# Patient Record
Sex: FEMALE | Race: WHITE | URBAN - METROPOLITAN AREA
[De-identification: names, ages, dates, MRNs, and addresses within clinical notes are randomized per-mention and may not be internally consistent; named-entity substitution may affect disease eponyms.]

---

## 2017-07-19 ENCOUNTER — LAB ONLY (OUTPATIENT)
Dept: INTERNAL MEDICINE CLINIC | Age: 47
End: 2017-07-19

## 2017-07-19 DIAGNOSIS — R79.89 LOW VITAMIN D LEVEL: Primary | ICD-10-CM

## 2017-07-19 LAB — VITAMIN D POC: 46.6 NG/ML (ref 30–96)

## 2018-03-19 PROBLEM — H83.2X9 VESTIBULAR DYSFUNCTION: Status: ACTIVE | Noted: 2018-03-19

## 2018-03-19 PROBLEM — M15.9 OSTEOARTHRITIS OF MULTIPLE JOINTS: Status: ACTIVE | Noted: 2018-03-19

## 2018-03-19 PROBLEM — G43.909 MIGRAINE HEADACHE: Status: ACTIVE | Noted: 2018-03-19

## 2018-03-19 PROBLEM — F41.9 ANXIETY: Status: ACTIVE | Noted: 2018-03-19

## 2018-03-19 PROBLEM — G40.909 SEIZURE DISORDER (HCC): Status: ACTIVE | Noted: 2018-03-19

## 2018-03-27 ENCOUNTER — OFFICE VISIT (OUTPATIENT)
Dept: INTERNAL MEDICINE CLINIC | Age: 48
End: 2018-03-27

## 2018-03-27 VITALS
DIASTOLIC BLOOD PRESSURE: 80 MMHG | OXYGEN SATURATION: 99 % | HEIGHT: 66 IN | SYSTOLIC BLOOD PRESSURE: 128 MMHG | BODY MASS INDEX: 24.91 KG/M2 | WEIGHT: 155 LBS | HEART RATE: 62 BPM

## 2018-03-27 DIAGNOSIS — G43.909 MIGRAINE WITHOUT STATUS MIGRAINOSUS, NOT INTRACTABLE, UNSPECIFIED MIGRAINE TYPE: ICD-10-CM

## 2018-03-27 DIAGNOSIS — M15.9 PRIMARY OSTEOARTHRITIS INVOLVING MULTIPLE JOINTS: ICD-10-CM

## 2018-03-27 DIAGNOSIS — F41.9 ANXIETY: ICD-10-CM

## 2018-03-27 DIAGNOSIS — Z00.00 ROUTINE PHYSICAL EXAMINATION: Primary | ICD-10-CM

## 2018-03-27 DIAGNOSIS — G40.909 SEIZURE DISORDER (HCC): ICD-10-CM

## 2018-03-27 DIAGNOSIS — L40.9 SCALP PSORIASIS: ICD-10-CM

## 2018-03-27 LAB
ALBUMIN SERPL-MCNC: 4.8 G/DL (ref 3.9–5.4)
ALKALINE PHOS POC: 72 U/L (ref 38–126)
ALT SERPL-CCNC: 29 U/L (ref 9–52)
AST SERPL-CCNC: 22 U/L (ref 14–36)
BACTERIA UA POCT, BACTPOCT: NORMAL
BILIRUB UR QL STRIP: NEGATIVE
BUN BLD-MCNC: 13 MG/DL (ref 7–17)
CALCIUM BLD-MCNC: 9.6 MG/DL (ref 8.4–10.2)
CASTS UA POCT: 0
CHLORIDE BLD-SCNC: 103 MMOL/L (ref 98–107)
CHOLEST SERPL-MCNC: 220 MG/DL (ref 0–200)
CLUE CELLS, CLUEPOCT: NEGATIVE
CO2 POC: 28 MMOL/L (ref 22–32)
CREAT BLD-MCNC: 0.7 MG/DL (ref 0.7–1.2)
CRYSTALS UA POCT, CRYSPOCT: NEGATIVE
EGFR (POC): 101.7
EPITHELIAL CELLS POCT: NORMAL
GLUCOSE POC: 86 MG/DL (ref 65–105)
GLUCOSE UR-MCNC: NEGATIVE MG/DL
GRAN# POC: 4.4 K/UL (ref 2–7.8)
GRAN% POC: 56.5 % (ref 37–92)
HBA1C MFR BLD HPLC: 4.6 % (ref 4.5–5.7)
HCT VFR BLD CALC: 43.7 % (ref 37–51)
HDLC SERPL-MCNC: 122 MG/DL (ref 35–130)
HGB BLD-MCNC: 14.6 G/DL (ref 12–18)
KETONES P FAST UR STRIP-MCNC: NEGATIVE MG/DL
LDL CHOLESTEROL POC: 79.4 MG/DL (ref 0–130)
LY# POC: 2.8 K/UL (ref 0.6–4.1)
LY% POC: 37.3 % (ref 10–58.5)
MCH RBC QN: 33.1 PG (ref 26–32)
MCHC RBC-ENTMCNC: 33.4 G/DL (ref 30–36)
MCV RBC: 99 FL (ref 80–97)
MID #, POC: 0.4 K/UL (ref 0–1.8)
MID% POC: 6.2 % (ref 0.1–24)
MUCUS UA POCT, MUCPOCT: NORMAL
PH UR STRIP: 5 [PH] (ref 5–7)
PLATELET # BLD: 260 K/UL (ref 140–440)
POTASSIUM SERPL-SCNC: 4.1 MMOL/L (ref 3.6–5)
PROT SERPL-MCNC: 8.2 G/DL (ref 6.3–8.2)
PROT UR QL STRIP: NEGATIVE
RBC # BLD: 4.4 M/UL (ref 4.2–6.3)
RBC UA POCT, RBCPOCT: 0
SODIUM SERPL-SCNC: 139 MMOL/L (ref 137–145)
SP GR UR STRIP: 1.02 (ref 1.01–1.02)
TCHOL/HDL RATIO (POC): 1.8 (ref 0–4)
TOTAL BILIRUBIN POC: 0.6 MG/DL (ref 0.2–1.3)
TRICH UA POCT, TRICHPOC: NEGATIVE
TRIGL SERPL-MCNC: 93 MG/DL (ref 0–200)
UA UROBILINOGEN AMB POC: NORMAL (ref 0.2–1)
URINALYSIS CLARITY POC: CLEAR
URINALYSIS COLOR POC: NORMAL
URINE BLOOD POC: NEGATIVE
URINE CULT COMMENT, POCT: NORMAL
URINE LEUKOCYTES POC: NEGATIVE
URINE NITRITES POC: NEGATIVE
VLDLC SERPL CALC-MCNC: 18.6 MG/DL
WBC # BLD: 7.6 K/UL (ref 4.1–10.9)
WBC UA POCT, WBCPOCT: 0
YEAST UA POCT, YEASTPOC: NEGATIVE

## 2018-03-27 RX ORDER — SUMATRIPTAN 50 MG/1
50 TABLET, FILM COATED ORAL
COMMUNITY
End: 2018-03-27 | Stop reason: SDUPTHER

## 2018-03-27 RX ORDER — ALPRAZOLAM 0.5 MG/1
0.5 TABLET ORAL AS NEEDED
COMMUNITY
End: 2018-03-27 | Stop reason: SDUPTHER

## 2018-03-27 RX ORDER — CARBAMAZEPINE 200 MG/1
200 TABLET ORAL DAILY
Qty: 225 TAB | Refills: 3 | Status: SHIPPED | OUTPATIENT
Start: 2018-03-27 | End: 2019-04-15 | Stop reason: SDUPTHER

## 2018-03-27 RX ORDER — TRIAMCINOLONE ACETONIDE 1 MG/G
CREAM TOPICAL 2 TIMES DAILY
Qty: 15 G | Refills: 0 | Status: SHIPPED | OUTPATIENT
Start: 2018-03-27 | End: 2019-04-15 | Stop reason: SDUPTHER

## 2018-03-27 RX ORDER — SUMATRIPTAN 50 MG/1
50 TABLET, FILM COATED ORAL
Qty: 27 TAB | Refills: 3 | Status: SHIPPED | OUTPATIENT
Start: 2018-03-27 | End: 2018-07-24 | Stop reason: SDUPTHER

## 2018-03-27 RX ORDER — ALPRAZOLAM 0.5 MG/1
0.5 TABLET ORAL AS NEEDED
Qty: 30 TAB | Refills: 1 | Status: SHIPPED | OUTPATIENT
Start: 2018-03-27 | End: 2019-04-15 | Stop reason: SDUPTHER

## 2018-03-27 NOTE — MR AVS SNAPSHOT
[address] [place] [address] [address] [address] [phone] Patient: Neeraj Carvajal MRN: [id number] :[id number] Visit Information Date & Time Provider Department Dept. Phone Encounter #  
 3/27/2018  3:00 PM Zach Hayes MD [place] [id number] [phone] [id number] Follow-up Instructions Return in about 1 year (around 3/27/2019). Upcoming Health Maintenance Date Due DTaP/Tdap/Td series (1 - Tdap) 3/6/1990 PAP AKA CERVICAL CYTOLOGY 3/6/1990 Influenza Age 5 to Adult 2017 Allergies as of 3/27/2018  Review Complete On: 3/27/2018 By: Zach Hayes MD  
 No Known Allergies Current Immunizations  Never Reviewed Name Date Influenza Vaccine 2017 Not reviewed this visit You Were Diagnosed With   
  
 Codes Comments Routine physical examination    -  Primary ICD-10-CM: Z00.00 ICD-9-CM: V70.0 Seizure disorder ([facility] [address]     ICD-10-CM: G40.909 ICD-9-CM: 345.90 Migraine without status migrainosus, not intractable, unspecified migraine type     ICD-10-CM: G43.909 ICD-9-CM: 346.90 Anxiety     ICD-10-CM: F41.9 ICD-9-CM: 300.00 Primary osteoarthritis involving multiple joints     ICD-10-CM: M15.0 ICD-9-CM: 715.09 Scalp psoriasis     ICD-10-CM: L40.9 ICD-9-CM: 696.1 Vitals BP Pulse Height(growth percentile) Weight(growth percentile) SpO2 BMI  
 128/80 (BP 1 Location: Left arm, BP Patient Position: Sitting) 62 5' 6\" (1.676 m) 155 lb (70.3 kg) 99% 25.02 kg/m2 Smoking Status Never Smoker BMI and BSA Data Body Mass Index Body Surface Area 25.02 kg/m 2 1.81 m 2 Preferred Pharmacy Pharmacy Name Phone [id number] [address] [place], [place] - [place] [phone] Your Updated Medication List  
  
   
This list is accurate as of 3/27/18  3:43 PM.  Always use your most recent med list.  
  
  
  
  
 ALPRAZolam 0.5 mg tablet Commonly known as:  Mame Ferreiraalfredito Take 1 Tab by mouth as needed for Anxiety (for dizziness). Max Daily Amount: 48 mg.  
  
 carBAMazepine 200 mg tablet Commonly known as:  TEGretol Take 1 Tab by mouth daily. And then 1 1/2 tablets at night SUMAtriptan 50 mg tablet Commonly known as:  IMITREX Take 1 Tab by mouth once as needed for Migraine. triamcinolone acetonide 0.1 % topical cream  
Commonly known as:  KENALOG Apply  to affected area two (2) times a day. use thin layer Prescriptions Printed Refills ALPRAZolam (XANAX) 0.5 mg tablet 1 Sig: Take 1 Tab by mouth as needed for Anxiety (for dizziness). Max Daily Amount: 48 mg. Class: Print Route: Oral  
  
Prescriptions Sent to Pharmacy Refills  
 carBAMazepine (TEGRETOL) 200 mg tablet 3 Sig: Take 1 Tab by mouth daily. And then 1 1/2 tablets at night Class: Normal  
 Pharmacy: [address], [address] Ph #: [phone] Route: Oral  
 SUMAtriptan (IMITREX) 50 mg tablet 3 Sig: Take 1 Tab by mouth once as needed for Migraine. Class: Normal  
 Pharmacy: [address], [address] Ph #: [phone] Route: Oral  
 triamcinolone acetonide (KENALOG) 0.1 % topical cream 0 Sig: Apply  to affected area two (2) times a day. use thin layer Class: Normal  
 Pharmacy: [facility] [address] [place] [address] Ph #: [phone] Route: Topical  
  
We Performed the Following AMB POC COMPLETE CBC,AUTOMATED ENTER [id number] CPT(R)] AMB POC COMPREHENSIVE METABOLIC PANEL [80423 CPT(R)] AMB POC HEMOGLOBIN A1C [04945 CPT(R)] AMB POC LIPID PROFILE [40150 CPT(R)] AMB POC TSH [46119 CPT(R)] AMB POC URINALYSIS DIP STICK AUTO W/ MICRO  [81027 CPT(R)] CARBAMAZEPINE [id number] CPT(R)] COLLECTION VENOUS BLOOD,VENIPUNCTURE [id number] CPT(R)] Follow-up Instructions Return in about 1 year (around 3/27/2019). Introducing [facility]! Tammi Banks introduces [employer] patient portal. Now you can access parts of your medical record, email your doctor's office, and request medication refills online. 1. In your internet browser, go to [email or web address] 2. Click on the First Time User? Click Here link in the Sign In box. You will see the New Member Sign Up page. 3. Enter your [employer]Learning Access Code exactly as it appears below. You will not need to use this code after youve completed the sign-up process. If you do not sign up before the expiration date, you must request a new code. · [employer] Access Code: [facility] Expires: 6/25/2018  3:43 PM 
 
4. Enter the last four digits of your Social Security Number (xxxx) and Date of Birth (mm/dd/yyyy) as indicated and click Submit. You will be taken to the next sign-up page. 5. Create a GeoLearning ID. This will be your [employer]Learning login ID and cannot be changed, so think of one that is secure and easy to remember. 6. Create a GeoLearning password. You can change your password at any time. 7. Enter your Password Reset Question and Answer. This can be used at a later time if you forget your password. 8. Enter your e-mail address. You will receive e-mail notification when new information is available in [address] 9. Click Sign Up. You can now view and download portions of your medical record. 10. Click the Download Summary menu link to download a portable copy of your medical information. If you have questions, please visit the Frequently Asked Questions section of the [employer] website. Remember, [employer] is NOT to be used for urgent needs. For medical emergencies, dial 911. Now available from your iPhone and Android! Please provide this summary of care documentation to your next provider. If you have any questions after today's visit, please call [phone].

## 2018-03-27 NOTE — PROGRESS NOTES
Subjective: This note will not be viewable in [address]    51 y.o. female for annual Comprehensive personal healthcare examination. She is a 54-year-old  female a 6  who presents to the office today for complete checkup. Her medical history is pertinent for seizure disorder that she has had since she was in college in [facility]. She is tolerated her medication without problems. The patient also has migraine headaches for which she periodically takes Imitrex she has been on Tegretol since that time and is had no further seizures. And does have some anxiety for which he uses alprazolam.    The patient does complain of an itchy patch of skin at the base of her scalp in the neck region. It is been flaking and itchy for some time and she is applied Benadryl cream without relief. The patient had a Pap test and mammogram in August which was normal.  Patient is a non-smoker and drinks lightly. Review of systems otherwise negative is noted. History of present illness: This patient has multiple medical problems. These include:   Patient Active Problem List   Diagnosis Code    Anxiety F41.9    Osteoarthritis of multiple joints M15.9    Migraine headache G43.909    Seizure disorder ([facility] [address] G40.909    Vestibular dysfunction H83.2X9    Scalp psoriasis L40.9          Past Medical History:   Diagnosis Date    Anxiety 3/19/2018    Migraine headache 3/19/2018    Osteoarthritis of multiple joints 3/19/2018    Scalp psoriasis 3/27/2018    Seizure disorder ([facility] [address] 3/19/2018    Vestibular dysfunction 3/19/2018     Past Surgical History:   Procedure Laterality Date    HX TONSILLECTOMY       No Known Allergies  Current Outpatient Prescriptions   Medication Sig Dispense Refill    carBAMazepine (TEGRETOL) 200 mg tablet Take 1 Tab by mouth daily. And then 1 1/2 tablets at night 225 Tab 3    SUMAtriptan (IMITREX) 50 mg tablet Take 1 Tab by mouth once as needed for Migraine.  27 Tab 3    ALPRAZolam (XANAX) 0.5 mg tablet Take 1 Tab by mouth as needed for Anxiety (for dizziness). Max Daily Amount: 48 mg. 30 Tab 1    triamcinolone acetonide (KENALOG) 0.1 % topical cream Apply  to affected area two (2) times a day. use thin layer 15 g 0     Social History     Social History    Marital status:      Spouse name: N/A    Number of children: 2    Years of education: N/A     Occupational History          Social History Main Topics    Smoking status: Never Smoker    Smokeless tobacco: Never Used    Alcohol use 1.8 oz/week     3 Glasses of wine per week    Drug use: No    Sexual activity: Not Asked     Other Topics Concern    None     Social History Narrative     Family History   Problem Relation Age of Onset    Arthritis-osteo Mother     Heart Attack Father     Hypertension Father     Breast Cancer Maternal Aunt        Health Maintenance   Topic Date Due    DTaP/Tdap/Td series (1 - Tdap) 03/06/1990    PAP AKA CERVICAL CYTOLOGY  03/06/1990    Influenza Age 5 to Adult  08/01/2017       Review of Systems  Constitutional:  She denies fever, weight loss, sweats or fatigue. HEENT:  No blurred or double vision, headache or dizziness. No difficulty with swallowing, taste, speech or smell. Respiratory:  No cough, wheezing or shortness of breath. No sputum production. Cardiac:  Denies chest pain, palpitations, unexplained indigestion, syncope, edema, PND or orthopnea. GI:  No changes in bowel movements, no abdominal pain, no bloating, anorexia, nausea, vomiting or heartburn. :  No frequency or dysuria. Denies incontinence. Extremities:  No joint pain, stiffness or swelling. Skin: Positive for localized rash in her scalp. Neurological:  No numbness, tingling, burning paresthesias or loss of motor strength. No syncope, dizziness, frequent headaches or memory loss.   ROS otherwise negative      Objective:     Vitals:    03/27/18 1512   BP: 128/80   Pulse: 62 SpO2: 99%   Weight: 155 lb (70.3 kg)   Height: 5' 6\" (1.676 m)   PainSc:   0 - No pain     Body mass index is 25.02 kg/(m^2). Physical Examination:   General Appearance:  Well-developed, well-nourished, no acute distress. Vision:  Deferred to ophthalmologist.       HEENT:    Ears:  The TMs and ear canals were clear. Eyes:  The pupillary responses were normal.  Extraocular muscle function intact. Lids and conjunctiva not injected. No conjunctiva redness or drainage. Pharynx:  Clear with teeth in good repair. No masses were noted. Neck:  Supple without thyromegaly or adenopathy. No JVD noted. No carotid bruits. Lungs:  Clear to auscultation and percussion. Cardiac:  Regular rate and rhythm without murmur. PMI not displaced. No gallop, rub or click. Abdomen:  Flat, soft, non-tender without palpable organomegaly or mass. No pulsatile mass was felt, and no bruit was heard. Bowel sounds were active. Breasts:  Deferred to GYN  GYN: Deferred to GYN    Rectal exam: Deferred to GYN    Extremities:  No clubbing, cyanosis or edema. Pulses:  Dorsalis pedis and posterior tibial pulses felt without difficulty. Skin: There is a rash at the posterior base of her scalp in the posterior neck region which has an erythematous base and silvery scaly skin lesions. Lymph Nodes:  None felt in the cervical, supraclavicular, axillary or inguinal region. Neurological:  Cranial nerves II-XII grossly intact. Motor strength 5/5. DTRs 2+ and symmetric.   Station and gait normal.  Physical exam otherwise negative        Assessment/Plan:     Diagnoses and all orders for this visit:    Routine physical examination  -     AMB POC HEMOGLOBIN A1C  -     AMB POC COMPLETE CBC,AUTOMATED ENTER  -     AMB POC COMPREHENSIVE METABOLIC PANEL  -     AMB POC LIPID PROFILE  -     COLLECTION VENOUS BLOOD,VENIPUNCTURE  -     AMB POC TSH  -     AMB POC URINALYSIS DIP STICK AUTO W/ MICRO   -     CARBAMAZEPINE    Seizure disorder (HCC)  - CARBAMAZEPINE  -     carBAMazepine (TEGRETOL) 200 mg tablet; Take 1 Tab by mouth daily. And then 1 1/2 tablets at night, Normal, Disp-225 Tab, R-3    Migraine without status migrainosus, not intractable, unspecified migraine type  -     SUMAtriptan (IMITREX) 50 mg tablet; Take 1 Tab by mouth once as needed for Migraine., Normal, Disp-27 Tab, R-3    Anxiety  -     ALPRAZolam (XANAX) 0.5 mg tablet; Take 1 Tab by mouth as needed for Anxiety (for dizziness). Max Daily Amount: 48 mg., Print, Disp-30 Tab, R-1    Primary osteoarthritis involving multiple joints    Scalp psoriasis  -     triamcinolone acetonide (KENALOG) 0.1 % topical cream; Apply  to affected area two (2) times a day. use thin layer, Normal, Disp-15 g, R-0        Other instructions: The patient's medications are reviewed, reconciled and refilled. No change in her current medical regimen is made. We will place on triamcinolone cream for the rash in her scalp which most likely is psoriasis. I have recommended a dermatology evaluation. She will need a colonoscopy at age 47    Await results of multiple labs    Follow-up yearly    Follow-up Disposition:  Return in about 1 year (around 3/27/2019).     Jabier Mckeon MD

## 2018-03-27 NOTE — PROGRESS NOTES
Joseph Le is a 51 y.o. female presenting for Complete Physical  .     1. Have you been to the ER, urgent care clinic since your last visit? Hospitalized since your last visit? No    2. Have you seen or consulted any other health care providers outside of the [address] [place] [address] since your last visit? Include any pap smears or colon screening. No    No flowsheet data found. No flowsheet data found. PHQ over the last two weeks 3/27/2018   Little interest or pleasure in doing things Not at all   Feeling down, depressed or hopeless Not at all   Total Score PHQ 2 0       There are no discontinued medications.

## 2018-03-27 NOTE — PATIENT INSTRUCTIONS
Epilepsy: Care Instructions  Your Care Instructions    Epilepsy is a common condition that causes repeated seizures. The seizures are caused by bursts of electrical activity in the brain that aren't normal. Seizures may cause problems with muscle control, movement, speech, vision, or awareness. They can be scary. Epilepsy affects each person differently. Some people have only a few seizures. Others get them more often. If you know what triggers a seizure, you may be able to avoid having one. You can take medicines to control and reduce seizures. You and your doctor will need to find the right combination, schedule, and dose of medicine. This may take time and careful changes. Seizures may get worse and happen more often over time. Follow-up care is a key part of your treatment and safety. Be sure to make and go to all appointments, and call your doctor if you are having problems. It's also a good idea to know your test results and keep a list of the medicines you take. How can you care for yourself at home? · Be safe with medicines. Take your medicines exactly as prescribed. Call your doctor if you think you are having a problem with your medicine. · Make a treatment plan with your doctor. Be sure to follow your plan. · Try to identify and avoid things that may make you more likely to have a seizure. These may include:  ¨ Not getting enough sleep. ¨ Using drugs or alcohol. ¨ Being emotionally stressed. ¨ Skipping meals. · Keep a record of any seizures you have. Note the date, time of day, and any details about the seizure that you can remember. Your doctor can use this information to plan or adjust your medicine or other treatment. · Be sure that any doctor treating you for another condition knows that you have epilepsy. Each doctor should know what medicines you are taking, if any. · Wear a medical ID bracelet. You can buy this at most [email or web address].  If you have a seizure that leaves you unconscious or unable to speak for yourself, this bracelet will let those who are treating you know that you have epilepsy. · Talk to your doctor about whether it is safe for you to do certain activities, such as drive or swim. When should you call for help? Call 911 anytime you think you may need emergency care. For example, call if:  ? · A seizure does not stop as it normally does. ? · You have new symptoms such as:  ¨ Numbness, tingling, or weakness on one side of your body or face. ¨ Vision changes. ¨ Trouble speaking or thinking clearly. ?Call your doctor now or seek immediate medical care if:  ? · You have a fever. ? · You have a severe headache. ? Watch closely for changes in your health, and be sure to contact your doctor if:  ? · The normal pattern or features of your seizures change. Where can you learn more? Go to http://myranda-kerry.info/. Ivan Raza in the search box to learn more about \"Epilepsy: Care Instructions. \"  Current as of: October 14, 2016  Content Version: 11.4  © 3841-8212 [employer]. Care instructions adapted under license by [employer] (which disclaims liability or warranty for this information). If you have questions about a medical condition or this instruction, always ask your healthcare professional. [place] [address] any warranty or liability for your use of this information.

## 2018-03-28 LAB
CARBAMAZEPINE SERPL-MCNC: 5.5 UG/ML (ref 4–12)
TSH BLD-ACNC: 2.62 UIU/ML (ref 0.4–4.2)

## 2018-07-24 ENCOUNTER — TELEPHONE (OUTPATIENT)
Dept: INTERNAL MEDICINE CLINIC | Age: 48
End: 2018-07-24

## 2018-07-24 DIAGNOSIS — G43.909 MIGRAINE WITHOUT STATUS MIGRAINOSUS, NOT INTRACTABLE, UNSPECIFIED MIGRAINE TYPE: ICD-10-CM

## 2018-07-24 RX ORDER — SUMATRIPTAN 100 MG/1
100 TABLET, FILM COATED ORAL
Qty: 13 TAB | Refills: 0 | Status: SHIPPED | OUTPATIENT
Start: 2018-07-24 | End: 2018-10-27 | Stop reason: SDUPTHER

## 2018-07-24 NOTE — TELEPHONE ENCOUNTER
Patient is calling, she is requesting to have a prescription for 100mg of Imitrex (not on medication list) be sent to the pharmacy on file. She advises that the last time she came in she was prescribed a 50mg prescription and she is easily running through this with the rain we are currently having.      Best call back # for patient: [phone]  Pharmacy on file verified

## 2018-10-27 DIAGNOSIS — G43.909 MIGRAINE WITHOUT STATUS MIGRAINOSUS, NOT INTRACTABLE, UNSPECIFIED MIGRAINE TYPE: ICD-10-CM

## 2018-10-28 RX ORDER — SUMATRIPTAN 100 MG/1
TABLET, FILM COATED ORAL
Qty: 13 TAB | Refills: 0 | Status: SHIPPED | OUTPATIENT
Start: 2018-10-28 | End: 2019-02-18 | Stop reason: SDUPTHER

## 2019-02-18 DIAGNOSIS — G43.909 MIGRAINE WITHOUT STATUS MIGRAINOSUS, NOT INTRACTABLE, UNSPECIFIED MIGRAINE TYPE: ICD-10-CM

## 2019-02-18 RX ORDER — SUMATRIPTAN 100 MG/1
TABLET, FILM COATED ORAL
Qty: 13 TAB | Refills: 0 | Status: SHIPPED | OUTPATIENT
Start: 2019-02-18 | End: 2019-04-15 | Stop reason: SDUPTHER

## 2019-02-18 NOTE — TELEPHONE ENCOUNTER
Pharmacy on file verified  Requested Prescriptions     Pending Prescriptions Disp Refills    SUMAtriptan (IMITREX) 100 mg tablet 13 Tab 4     Sig: TAKE 1 TABLET BY MOUTH ONCE AS NEEDED FOR MIGRAINE UP TO 1 DOSE     LOV 3/27/18  NOV 0/00/0000 - due 3/27/19  LF 10/28/18

## 2019-04-15 ENCOUNTER — OFFICE VISIT (OUTPATIENT)
Dept: INTERNAL MEDICINE CLINIC | Age: 49
End: 2019-04-15

## 2019-04-15 VITALS
HEIGHT: 66 IN | HEART RATE: 93 BPM | OXYGEN SATURATION: 99 % | SYSTOLIC BLOOD PRESSURE: 120 MMHG | RESPIRATION RATE: 18 BRPM | TEMPERATURE: 98.5 F | BODY MASS INDEX: 24.98 KG/M2 | WEIGHT: 155.4 LBS | DIASTOLIC BLOOD PRESSURE: 72 MMHG

## 2019-04-15 DIAGNOSIS — L20.84 INTRINSIC ECZEMA: ICD-10-CM

## 2019-04-15 DIAGNOSIS — Z00.00 ROUTINE PHYSICAL EXAMINATION: Primary | ICD-10-CM

## 2019-04-15 DIAGNOSIS — E55.9 VITAMIN D DEFICIENCY: ICD-10-CM

## 2019-04-15 DIAGNOSIS — G43.909 MIGRAINE WITHOUT STATUS MIGRAINOSUS, NOT INTRACTABLE, UNSPECIFIED MIGRAINE TYPE: ICD-10-CM

## 2019-04-15 DIAGNOSIS — L40.9 SCALP PSORIASIS: ICD-10-CM

## 2019-04-15 DIAGNOSIS — G40.909 SEIZURE DISORDER (HCC): ICD-10-CM

## 2019-04-15 DIAGNOSIS — F41.9 ANXIETY: ICD-10-CM

## 2019-04-15 LAB
A-G RATIO,AGRAT: 1.5 RATIO
ALBUMIN SERPL-MCNC: 4.8 G/DL (ref 3.9–5.4)
ALP SERPL-CCNC: 85 U/L (ref 38–126)
ALT SERPL-CCNC: 11 U/L (ref 9–52)
ANION GAP SERPL CALC-SCNC: 17 MMOL/L
AST SERPL W P-5'-P-CCNC: 25 U/L (ref 14–36)
BILIRUB SERPL-MCNC: 0.6 MG/DL (ref 0.2–1.3)
BILIRUB UR QL: NEGATIVE
BUN SERPL-MCNC: 14 MG/DL (ref 7–17)
BUN/CREATININE RATIO,BUCR: 23 RATIO
CALCIUM SERPL-MCNC: 9.8 MG/DL (ref 8.4–10.2)
CHLORIDE SERPL-SCNC: 98 MMOL/L (ref 98–107)
CHOL/HDL RATIO,CHHD: 2 RATIO (ref 0–4)
CHOLEST SERPL-MCNC: 215 MG/DL (ref 0–200)
CLARITY: CLEAR
CO2 SERPL-SCNC: 25 MMOL/L (ref 22–32)
COLOR UR: ABNORMAL
CREAT SERPL-MCNC: 0.6 MG/DL (ref 0.7–1.2)
ERYTHROCYTE [DISTWIDTH] IN BLOOD BY AUTOMATED COUNT: 11.5 %
GLOBULIN,GLOB: 3.3
GLUCOSE 24H UR-MRATE: NEGATIVE G/(24.H)
GLUCOSE SERPL-MCNC: 86 MG/DL (ref 65–105)
HCT VFR BLD AUTO: 44.1 % (ref 37–51)
HDLC SERPL-MCNC: 120 MG/DL (ref 35–130)
HGB BLD-MCNC: 15.1 G/DL (ref 12–18)
HGB UR QL STRIP: NEGATIVE
KETONES UR QL STRIP.AUTO: ABNORMAL
LDL/HDL RATIO,LDHD: 1 RATIO
LDLC SERPL CALC-MCNC: 79 MG/DL (ref 0–130)
LEUKOCYTE ESTERASE: NEGATIVE
LYMPHOCYTES ABSOLUTE: 1.8 K/UL (ref 0.6–4.1)
LYMPHOCYTES NFR BLD: 25.6 % (ref 10–58.5)
MCH RBC QN AUTO: 35.1 PG (ref 26–32)
MCHC RBC AUTO-ENTMCNC: 34.2 G/DL (ref 30–36)
MCV RBC AUTO: 102.5 FL (ref 80–97)
MONOCYTES ABS-DIF,2141: 0.6 K/UL (ref 0–1.8)
MONOCYTES NFR BLD: 8.7 % (ref 0.1–24)
NEUTROPHILS # BLD: 65.7 % (ref 37–92)
NEUTROPHILS ABS,2156: 4.7 K/UL (ref 2–7.8)
NITRITE UR QL STRIP.AUTO: NEGATIVE
PH UR STRIP: 5 [PH] (ref 5–7)
PLATELET # BLD AUTO: 275 K/UL (ref 140–440)
PMV BLD AUTO: 6.9 FL
POTASSIUM SERPL-SCNC: 4.2 MMOL/L (ref 3.6–5)
PROT SERPL-MCNC: 8.1 G/DL (ref 6.3–8.2)
PROT UR STRIP-MCNC: NEGATIVE MG/DL
RBC # BLD AUTO: 4.3 M/UL (ref 4.2–6.3)
RBC #/AREA URNS HPF: 0 #/HPF
SODIUM SERPL-SCNC: 140 MMOL/L (ref 137–145)
SP GR UR REFRACTOMETRY: 1.03 (ref 1–1.03)
TRIGL SERPL-MCNC: 80 MG/DL (ref 0–200)
UROBILINOGEN UR QL STRIP.AUTO: NEGATIVE
VLDLC SERPL CALC-MCNC: 16 MG/DL
WBC # BLD AUTO: 7.1 K/UL (ref 4.1–10.9)
WBC URNS QL MICRO: 0 #/HPF

## 2019-04-15 RX ORDER — TRIAMCINOLONE ACETONIDE 1 MG/G
CREAM TOPICAL 2 TIMES DAILY
Qty: 45 G | Refills: 0 | Status: SHIPPED | OUTPATIENT
Start: 2019-04-15 | End: 2020-08-03 | Stop reason: ALTCHOICE

## 2019-04-15 RX ORDER — SUMATRIPTAN 100 MG/1
TABLET, FILM COATED ORAL
Qty: 13 TAB | Refills: 3 | Status: SHIPPED | OUTPATIENT
Start: 2019-04-15 | End: 2020-01-08

## 2019-04-15 RX ORDER — CARBAMAZEPINE 200 MG/1
200 TABLET ORAL DAILY
Qty: 225 TAB | Refills: 3 | Status: SHIPPED | OUTPATIENT
Start: 2019-04-15 | End: 2020-04-09

## 2019-04-15 RX ORDER — ALPRAZOLAM 0.5 MG/1
0.5 TABLET ORAL AS NEEDED
Qty: 30 TAB | Refills: 1 | Status: SHIPPED | OUTPATIENT
Start: 2019-04-15 | End: 2020-08-03 | Stop reason: SDUPTHER

## 2019-04-15 NOTE — PROGRESS NOTES
Subjective: This note will not be viewable in [address] 
 
 
 
47 y.o. female for annual Comprehensive personal healthcare examination. Mrs. Marcos Stanley is a 71-year-old  female, a 10 , mother of 2 who presents to the office today for complete checkup. She is generally been in excellent health. She does have a seizure disorder for which he takes Tegretol twice daily. There is been no reported seizures within the last year. She has had no side effects related to the medication. She does have periodic menstrual periods which have been improving as she is gotten older. She does take Imitrex on a as needed basis for this and this seems to work effectively for her. She has anxiety for which he uses Xanax on a as needed basis and there is a history of intermittent eczema which is responded well to triamcinolone cream. 
 
The patient also has had a history of vitamin D deficiency and does take a supplement for this over-the-counter. The patient is up-to-date on Pap testing and mammography and will have these repeated this summer. She will be due for colonoscopy as she is now 47. Her review of systems is otherwise negative is noted. History of present illness: This patient has multiple medical problems. These include:  
Patient Active Problem List  
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  
Diagnosis Date  Anxiety 3/19/2018  Migraine headache 3/19/2018  Osteoarthritis of multiple joints 3/19/2018  Scalp psoriasis 3/27/2018  Seizure disorder ([address] 3/19/2018  Vestibular dysfunction 3/19/2018 Past Surgical History:  
Procedure Laterality Date  HX TONSILLECTOMY No Known Allergies Current Outpatient Medications Medication Sig Dispense Refill  ALPRAZolam (XANAX) 0.5 mg tablet Take 1 Tab by mouth as needed for Anxiety (for dizziness). Max Daily Amount: 48 mg. 30 Tab 1  carBAMazepine (TEGRETOL) 200 mg tablet Take 1 Tab by mouth daily. And then 1 1/2 tablets at night 225 Tab 3  
 SUMAtriptan (IMITREX) 100 mg tablet TAKE 1 TABLET BY MOUTH ONCE AS NEEDED FOR MIGRAINE UP TO 1 DOSE 13 Tab 3  
 triamcinolone acetonide (KENALOG) 0.1 % topical cream Apply  to affected area two (2) times a day. use thin layer 45 g 0 Social History Socioeconomic History  Marital status:  Spouse name: Not on file  Number of children: 2  
 Years of education: Not on file  Highest education level: Not on file Occupational History  Occupation:  Tobacco Use  Smoking status: Never Smoker  Smokeless tobacco: Never Used Substance and Sexual Activity  Alcohol use: Yes Alcohol/week: 1.8 oz Types: 3 Glasses of wine per week  Drug use: No  
 
Family History Problem Relation Age of Onset 24 [facility] Arthritis-osteo Mother  Heart Attack Father  Hypertension Father  Breast Cancer Maternal Aunt Health Maintenance Topic Date Due  
 DTaP/Tdap/Td series (1 - Tdap) 03/06/1990  PAP AKA CERVICAL CYTOLOGY  03/06/1990  Shingrix Vaccine Age 50> (1 of 2) 03/06/2019  BREAST CANCER SCRN MAMMOGRAM  03/06/2019  
 FOBT Q 1 YEAR AGE 50-75  03/06/2019  Influenza Age 5 to Adult  08/01/2019  Pneumococcal 0-64 years  Aged Out Review of Systems Constitutional:  She denies fever, weight loss, sweats or fatigue. HEENT:  No blurred or double vision, headache or dizziness. No difficulty with swallowing, taste, speech or smell. Respiratory:  No cough, wheezing or shortness of breath. No sputum production. Cardiac:  Denies chest pain, palpitations, unexplained indigestion, syncope, edema, PND or orthopnea. GI:  No changes in bowel movements, no abdominal pain, no bloating, anorexia, nausea, vomiting or heartburn. :  No frequency or dysuria. Denies incontinence. Extremities:  No joint pain, stiffness or swelling. Skin:  No recent rashes or mole changes. Neurological:  No numbness, tingling, burning paresthesias or loss of motor strength. No syncope, dizziness, frequent headaches or memory loss. ROS otherwise negative Objective:  
 
Vitals:  
 04/15/19 1435 BP: 120/72 Pulse: 93 Resp: 18 Temp: 98.5 °F (36.9 °C) TempSrc: Oral  
SpO2: 99% Weight: 155 lb 6.4 oz (70.5 kg) Height: 5' 6\" (1.676 m) PainSc:   0 - No pain Body mass index is 25.08 kg/m². Physical Examination:  
General Appearance:  Well-developed, well-nourished, no acute distress. Vision:  Deferred to ophthalmologist.      
HEENT:   
Ears:  The TMs and ear canals were clear. Eyes:  The pupillary responses were normal.  Extraocular muscle function intact. Lids and conjunctiva not injected. No conjunctiva redness or drainage. Pharynx:  Clear with teeth in good repair. No masses were noted. Neck:  Supple without thyromegaly or adenopathy. No JVD noted. No carotid bruits. Lungs:  Clear to auscultation and percussion. Cardiac:  Regular rate and rhythm without murmur. PMI not displaced. No gallop, rub or click. Abdomen:  Flat, soft, non-tender without palpable organomegaly or mass. No pulsatile mass was felt, and no bruit was heard. Bowel sounds were active. Breasts:  Deferred to GYN 
GYN: Deferred to GYN Rectal exam: Deferred to GYN Extremities:  No clubbing, cyanosis or edema. Pulses:  Dorsalis pedis and posterior tibial pulses felt without difficulty. Skin:  No rash or unusual mole changes noted. Lymph Nodes:  None felt in the cervical, supraclavicular, axillary or inguinal region. Neurological:  Cranial nerves II-XII grossly intact. Motor strength 5/5. DTRs 2+ and symmetric. Station and gait normal. 
Physical exam otherwise negative Assessment/Plan:  
 
Diagnoses and all orders for this visit: 
 
Routine physical examination 
-     COLLECTION VENOUS BLOOD,VENIPUNCTURE 
-     CBC WITH AUTOMATED DIFF 
-     LIPID PANEL 
-     METABOLIC PANEL, COMPREHENSIVE 
-     TSH 3RD GENERATION 
-     URINALYSIS W/MICROSCOPIC Seizure disorder ([address] -     carBAMazepine (TEGRETOL) 200 mg tablet; Take 1 Tab by mouth daily. And then 1 1/2 tablets at night, Normal, Disp-225 Tab, R-3 
-     CARBAMAZEPINE Anxiety -     ALPRAZolam (XANAX) 0.5 mg tablet; Take 1 Tab by mouth as needed for Anxiety (for dizziness). Max Daily Amount: 48 mg., Print, Disp-30 Tab, R-1 Migraine without status migrainosus, not intractable, unspecified migraine type -     SUMAtriptan (IMITREX) 100 mg tablet; TAKE 1 TABLET BY MOUTH ONCE AS NEEDED FOR MIGRAINE UP TO 1 DOSE, Normal, Disp-13 Tab, R-3 Intrinsic eczema Scalp psoriasis 
-     triamcinolone acetonide (KENALOG) 0.1 % topical cream; Apply  to affected area two (2) times a day. use thin layer, Normal, Disp-45 g, R-0 Vitamin D deficiency 
-     VITAMIN D, 25 HYDROXY Other instructions: The patient's medications were reviewed and reconciled. No change in her current medical regimen is made. Body mass index is 25 and appears to be normal build for her current weight Await results of multiple labs She will try to get back with us around the summertime in regards to scheduling her to see somebody for colonoscopy Age-appropriate vaccinations were reviewed and a Tdap shot and shingles vaccine were both recommended. Follow-up yearly Follow-up and Dispositions · Return in about 1 year (around 4/15/2020). Tank Coelho MD

## 2019-04-15 NOTE — PATIENT INSTRUCTIONS

## 2019-04-15 NOTE — PROGRESS NOTES
Aston Muñiz is a 47 y.o. female presenting for Complete Physical 
. 1. Have you been to the ER, urgent care clinic since your last visit? Hospitalized since your last visit? No 
 
2. Have you seen or consulted any other health care providers outside of the [address] [place] [address] since your last visit? Include any pap smears or colon screening. No 
 
No flowsheet data found. No flowsheet data found. 3 most recent PHQ Screens 4/15/2019 Little interest or pleasure in doing things Not at all Feeling down, depressed, irritable, or hopeless Not at all Total Score PHQ 2 0 There are no discontinued medications.

## 2019-04-16 LAB
25(OH)D3 SERPL-MCNC: 52 NG/ML (ref 30–96)
CARBAMAZEPINE SERPL-MCNC: 6.4 UG/ML (ref 4–12)
TSH SERPL DL<=0.05 MIU/L-ACNC: 2.46 UIU/ML (ref 0.34–5.6)

## 2019-06-28 ENCOUNTER — TELEPHONE (OUTPATIENT)
Dept: INTERNAL MEDICINE CLINIC | Age: 49
End: 2019-06-28

## 2019-06-28 DIAGNOSIS — Z12.11 COLON CANCER SCREENING: Primary | ICD-10-CM

## 2019-06-28 NOTE — TELEPHONE ENCOUNTER
Patient is calling, she is requesting to have a referral put in to have a colonoscopy done. She advises that she forgot to ask for this at her last appointment.     Best call back # for patient: [phone]

## 2019-08-01 ENCOUNTER — OFFICE VISIT (OUTPATIENT)
Dept: INTERNAL MEDICINE CLINIC | Age: 49
End: 2019-08-01

## 2019-08-01 VITALS
HEIGHT: 66 IN | DIASTOLIC BLOOD PRESSURE: 78 MMHG | HEART RATE: 94 BPM | WEIGHT: 155.4 LBS | BODY MASS INDEX: 24.98 KG/M2 | SYSTOLIC BLOOD PRESSURE: 116 MMHG | OXYGEN SATURATION: 94 % | TEMPERATURE: 97.6 F

## 2019-08-01 DIAGNOSIS — M25.531 RIGHT WRIST PAIN: Primary | ICD-10-CM

## 2019-08-01 DIAGNOSIS — M79.641 PAIN OF RIGHT HAND: ICD-10-CM

## 2019-08-01 NOTE — PATIENT INSTRUCTIONS

## 2019-08-01 NOTE — PROGRESS NOTES
This note will not be viewable in [address] Subjective:     Mrs. Jeromy Flores presents to the office today with complaints of right hand and wrist pain that has been ongoing since July 4th. The patient injured it while she was at [facility]. She was holding a boat and a week came and pushed her hand and wrist between the boat and the dock. The patient has had pain in the hand and wrist since that time. There is been no swelling or ecchymosis and she has had no limitation in range of motion or . She does note pain along the lateral wrist region and pain in the meta carpal region. She denies any forearm swelling or discomfort. Past Medical History:   Diagnosis Date    Anxiety 3/19/2018    Migraine headache 3/19/2018    Osteoarthritis of multiple joints 3/19/2018    Scalp psoriasis 3/27/2018    Seizure disorder ([address] 3/19/2018    Vestibular dysfunction 3/19/2018     Past Surgical History:   Procedure Laterality Date    HX TONSILLECTOMY       No Known Allergies  Current Outpatient Medications   Medication Sig Dispense Refill    ALPRAZolam (XANAX) 0.5 mg tablet Take 1 Tab by mouth as needed for Anxiety (for dizziness). Max Daily Amount: 48 mg. 30 Tab 1    carBAMazepine (TEGRETOL) 200 mg tablet Take 1 Tab by mouth daily. And then 1 1/2 tablets at night 225 Tab 3    SUMAtriptan (IMITREX) 100 mg tablet TAKE 1 TABLET BY MOUTH ONCE AS NEEDED FOR MIGRAINE UP TO 1 DOSE 13 Tab 3    triamcinolone acetonide (KENALOG) 0.1 % topical cream Apply  to affected area two (2) times a day. use thin layer 45 g 0     Social History     Socioeconomic History    Marital status:      Spouse name: Not on file    Number of children: 2    Years of education: Not on file    Highest education level: Not on file   Occupational History    Occupation:    Tobacco Use    Smoking status: Never Smoker    Smokeless tobacco: Never Used   Substance and Sexual Activity    Alcohol use:  Yes Alcohol/week: 3.0 standard drinks     Types: 3 Glasses of wine per week    Drug use: No     Family History   Problem Relation Age of Onset    Arthritis-osteo Mother     Heart Attack Father     Hypertension Father     Breast Cancer Maternal Aunt        Review of Systems:  GEN: no weight loss, weight gain, fatigue or night sweats  CV: no PND, orthopnea, or palpitations  Resp: no dyspnea on exertion, no cough  Abd: no nausea, vomiting or diarrhea  EXT: denies edema, claudication. Positive for right hand and wrist pain  Neurological ROS: no TIA or stroke symptoms  ROS otherwise negative      Objective:     Visit Vitals  /78 (BP 1 Location: Left arm, BP Patient Position: Sitting)   Pulse 94   Temp 97.6 °F (36.4 °C) (Oral)   Ht 5' 6\" (1.676 m)   Wt 155 lb 6.4 oz (70.5 kg)   SpO2 94%   BMI 25.08 kg/m²     Body mass index is 25.08 kg/m². General:   alert, cooperative and no distress   Extremities: No edema or cyanosis. Examination of the wrist reveals no obvious areas of swelling and no localized pain to palpation flexion extension and rotational movement of the wrist were normal.  Radial pulse was intact. Examination of the right hand reveals no obvious swelling, ecchymosis. Squeeze test of the metacarpal bones was negative flexion and extension of all digits was intact. Neuro: ..alert, oriented x3,speech normal in context and clarity, cranial nerves II-XII intact,motor strength: full proximally and distally,gait: normal  reflexes: full and symmetric     Physical exam otherwise negative         Assessment/Plan:     Diagnoses and all orders for this visit:    Right wrist pain  -     XR WRIST RT AP/LAT/OBL MIN 3V; Future    Pain of right hand  -     XR HAND RT MIN 3 V; Future        Other instructions:   I have personally reviewed x-rays of the right wrist and hand and did not see any fractures or other abnormalities. Patient may use hand and wrist as tolerated.   Have recommended warm soaks and to start Aleve 2 tablets twice daily to see if this will settle things down.     Follow-up if there is any worsening        Lina Krabbe, MD

## 2019-08-01 NOTE — PROGRESS NOTES
Flaquita Russell is a 47 y.o. female presenting for Wrist Pain (R)  . 1. Have you been to the ER, urgent care clinic since your last visit? Hospitalized since your last visit? No    2. Have you seen or consulted any other health care providers outside of the [address] [place] [address] since your last visit? Include any pap smears or colon screening. No    No flowsheet data found. No flowsheet data found. 3 most recent PHQ Screens 4/15/2019   Little interest or pleasure in doing things Not at all   Feeling down, depressed, irritable, or hopeless Not at all   Total Score PHQ 2 0       There are no discontinued medications.

## 2020-06-01 ENCOUNTER — TELEPHONE (OUTPATIENT)
Dept: INTERNAL MEDICINE CLINIC | Age: 50
End: 2020-06-01

## 2020-06-02 ENCOUNTER — TELEPHONE (OUTPATIENT)
Dept: INTERNAL MEDICINE CLINIC | Age: 50
End: 2020-06-02

## 2020-06-02 ENCOUNTER — OFFICE VISIT (OUTPATIENT)
Dept: INTERNAL MEDICINE CLINIC | Age: 50
End: 2020-06-02

## 2020-06-02 VITALS
BODY MASS INDEX: 26.36 KG/M2 | SYSTOLIC BLOOD PRESSURE: 122 MMHG | HEART RATE: 80 BPM | TEMPERATURE: 98.7 F | HEIGHT: 66 IN | OXYGEN SATURATION: 99 % | RESPIRATION RATE: 14 BRPM | WEIGHT: 164 LBS | DIASTOLIC BLOOD PRESSURE: 80 MMHG

## 2020-06-02 DIAGNOSIS — M25.562 ACUTE PAIN OF LEFT KNEE: Primary | ICD-10-CM

## 2020-06-02 RX ORDER — DICLOFENAC SODIUM 75 MG/1
75 TABLET, DELAYED RELEASE ORAL 2 TIMES DAILY
Qty: 60 TAB | Refills: 0 | Status: SHIPPED | OUTPATIENT
Start: 2020-06-02 | End: 2020-08-03 | Stop reason: ALTCHOICE

## 2020-06-02 NOTE — TELEPHONE ENCOUNTER
Check to see if there is a family history of osteoporosis. If not, we usually start scanning around the time of menopause.

## 2020-06-02 NOTE — PROGRESS NOTES
Pernell Cam is a 45 y.o. female presenting for Knee Pain (L)  . 1. Have you been to the ER, urgent care clinic since your last visit? Hospitalized since your last visit? No    2. Have you seen or consulted any other health care providers outside of the [address] [place] [address] since your last visit? Include any pap smears or colon screening. No    No flowsheet data found. No flowsheet data found. 3 most recent PHQ Screens 4/15/2019   Little interest or pleasure in doing things Not at all   Feeling down, depressed, irritable, or hopeless Not at all   Total Score PHQ 2 0       There are no discontinued medications.

## 2020-06-02 NOTE — PROGRESS NOTES
This note will not be viewable in [address] Subjective: The patient presents to the office today with complaints of left knee pain. Patient states that the pain is been present for 3 weeks and started after she had been doing squats and planks with her daughter. The knee has not locked or given out. There is been no swelling or warmth. She notes a crunching sensation with flexion and extension of the joint. She has randomly taken anti-inflammatory medication. She gives no prior history of any knee problems. Past Medical History:   Diagnosis Date    Anxiety 3/19/2018    Migraine headache 3/19/2018    Osteoarthritis of multiple joints 3/19/2018    Scalp psoriasis 3/27/2018    Seizure disorder ([address] 3/19/2018    Vestibular dysfunction 3/19/2018     Past Surgical History:   Procedure Laterality Date    HX TONSILLECTOMY       No Known Allergies  Current Outpatient Medications   Medication Sig Dispense Refill    diclofenac EC (VOLTAREN) 75 mg EC tablet Take 1 Tab by mouth two (2) times a day. 60 Tab 0    carBAMazepine (TEGretol) 200 mg tablet TAKE 1 TABLET DAILY AND THEN ONE AND ONE-HALF TABLETS AT NIGHT 225 Tab 0    SUMAtriptan (IMITREX) 100 mg tablet TAKE 1 TABLET ONCE AS NEEDED FOR MIGRAINE UP TO 1 DOSE 9 Tab 2    ALPRAZolam (XANAX) 0.5 mg tablet Take 1 Tab by mouth as needed for Anxiety (for dizziness). Max Daily Amount: 48 mg. 30 Tab 1    triamcinolone acetonide (KENALOG) 0.1 % topical cream Apply  to affected area two (2) times a day. use thin layer 45 g 0     Social History     Socioeconomic History    Marital status:      Spouse name: Not on file    Number of children: 2    Years of education: Not on file    Highest education level: Not on file   Occupational History    Occupation:    Tobacco Use    Smoking status: Never Smoker    Smokeless tobacco: Never Used   Substance and Sexual Activity    Alcohol use:  Yes     Alcohol/week: 3.0 standard drinks Types: 3 Glasses of wine per week    Drug use: No     Family History   Problem Relation Age of Onset    Arthritis-osteo Mother     Heart Attack Father     Hypertension Father     Breast Cancer Maternal Aunt        Review of Systems:  GEN: no weight loss, weight gain, fatigue or night sweats  CV: no PND, orthopnea, or palpitations  Resp: no dyspnea on exertion, no cough  Abd: no nausea, vomiting or diarrhea  EXT: Positive for left knee pain. Endocrine: no hair loss, excessive thirst or polyuria  Neurological ROS: no TIA or stroke symptoms  ROS otherwise negative      Objective:     Visit Vitals  /80 (BP 1 Location: Left arm, BP Patient Position: Sitting)   Pulse 80   Temp 98.7 °F (37.1 °C) (Oral)   Resp 14   Ht 5' 6\" (1.676 m)   Wt 164 lb (74.4 kg)   SpO2 99%   BMI 26.47 kg/m²     Body mass index is 26.47 kg/m². General:   alert, cooperative and no distress   Extremities:  Examination of the left knee reveals slight increase in size as compared to the right although she is left handed. There is no warmth or tenderness along the joint line. There is crepitation with range of motion of the knee felt. Drawer sign is negative. Neuro: ..alert, oriented x3,speech normal in context and clarity, cranial nerves II-XII intact,motor strength: full proximally and distally,gait: normal  reflexes: full and symmetric     Physical exam otherwise negative         Assessment/Plan:     Diagnoses and all orders for this visit:    Acute pain of left knee  -     diclofenac EC (VOLTAREN) 75 mg EC tablet; Take 1 Tab by mouth two (2) times a day., Normal, Disp-60 Tab, R-0        Other instructions: The patient's medications were reviewed and reconciled. 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     Will place on anti-inflammatory medication and if no improvement is noted in the knee pain over the next 2 weeks would recommend orthopedic referral.    I have recommended a complete checkup sometime during the summer. Follow-up and Dispositions    · Return if symptoms worsen or fail to improve. Oscar Foreman MD    Please note that this dictation was completed with [employer], the computer voice recognition software. Quite often unanticipated grammatical, syntax, homophones, and other interpretive errors are inadvertently transcribed by the computer software. Please disregard these errors. Please excuse any errors that have escaped final proofreading.

## 2020-06-09 ENCOUNTER — TELEPHONE (OUTPATIENT)
Dept: INTERNAL MEDICINE CLINIC | Age: 50
End: 2020-06-09

## 2020-06-09 DIAGNOSIS — M25.569 ACUTE KNEE PAIN, UNSPECIFIED LATERALITY: Primary | ICD-10-CM

## 2020-08-03 ENCOUNTER — OFFICE VISIT (OUTPATIENT)
Dept: INTERNAL MEDICINE CLINIC | Age: 50
End: 2020-08-03
Payer: COMMERCIAL

## 2020-08-03 VITALS
HEART RATE: 78 BPM | TEMPERATURE: 98.1 F | OXYGEN SATURATION: 99 % | BODY MASS INDEX: 26.58 KG/M2 | WEIGHT: 165.4 LBS | DIASTOLIC BLOOD PRESSURE: 78 MMHG | HEIGHT: 66 IN | SYSTOLIC BLOOD PRESSURE: 120 MMHG | RESPIRATION RATE: 16 BRPM

## 2020-08-03 DIAGNOSIS — G40.909 SEIZURE DISORDER (HCC): ICD-10-CM

## 2020-08-03 DIAGNOSIS — F41.9 ANXIETY: ICD-10-CM

## 2020-08-03 DIAGNOSIS — G43.909 MIGRAINE WITHOUT STATUS MIGRAINOSUS, NOT INTRACTABLE, UNSPECIFIED MIGRAINE TYPE: ICD-10-CM

## 2020-08-03 DIAGNOSIS — M15.9 PRIMARY OSTEOARTHRITIS INVOLVING MULTIPLE JOINTS: ICD-10-CM

## 2020-08-03 DIAGNOSIS — L40.9 SCALP PSORIASIS: ICD-10-CM

## 2020-08-03 DIAGNOSIS — Z00.00 ROUTINE PHYSICAL EXAMINATION: Primary | ICD-10-CM

## 2020-08-03 DIAGNOSIS — E55.9 VITAMIN D DEFICIENCY: ICD-10-CM

## 2020-08-03 LAB
25(OH)D3 SERPL-MCNC: 50 NG/ML (ref 30–96)
A-G RATIO,AGRAT: 1.4 RATIO
ALBUMIN SERPL-MCNC: 4.6 G/DL (ref 3.9–5.4)
ALP SERPL-CCNC: 119 U/L (ref 38–126)
ALT SERPL-CCNC: 13 U/L (ref 0–35)
ANION GAP SERPL CALC-SCNC: 13 MMOL/L
AST SERPL W P-5'-P-CCNC: 25 U/L (ref 14–36)
BILIRUB SERPL-MCNC: 0.8 MG/DL (ref 0.2–1.3)
BILIRUB UR QL: NEGATIVE
BUN SERPL-MCNC: 15 MG/DL (ref 7–17)
BUN/CREATININE RATIO,BUCR: 25 RATIO
CALCIUM SERPL-MCNC: 9.1 MG/DL (ref 8.4–10.2)
CHLORIDE SERPL-SCNC: 103 MMOL/L (ref 98–107)
CLARITY: CLEAR
CO2 SERPL-SCNC: 25 MMOL/L (ref 22–32)
COLOR UR: ABNORMAL
CREAT SERPL-MCNC: 0.6 MG/DL (ref 0.7–1.2)
ERYTHROCYTE [DISTWIDTH] IN BLOOD BY AUTOMATED COUNT: 12.7 %
GLOBULIN,GLOB: 3.3
GLUCOSE 24H UR-MRATE: NEGATIVE G/(24.H)
GLUCOSE SERPL-MCNC: 89 MG/DL (ref 65–105)
HCT VFR BLD AUTO: 47.4 % (ref 37–51)
HGB BLD-MCNC: 15.3 G/DL (ref 12–18)
HGB UR QL STRIP: NEGATIVE
KETONES UR QL STRIP.AUTO: ABNORMAL
LEUKOCYTE ESTERASE: NEGATIVE
LYMPHOCYTES ABSOLUTE: 1.7 K/UL (ref 0.6–4.1)
LYMPHOCYTES NFR BLD: 28.1 % (ref 10–58.5)
MCH RBC QN AUTO: 33.5 PG (ref 26–32)
MCHC RBC AUTO-ENTMCNC: 32.3 G/DL (ref 30–36)
MCV RBC AUTO: 103.7 FL (ref 80–97)
MONOCYTES ABS-DIF,2141: 0.4 K/UL (ref 0–1.8)
MONOCYTES NFR BLD: 6.4 % (ref 0.1–24)
NEUTROPHILS # BLD: 65.5 % (ref 37–92)
NEUTROPHILS ABS,2156: 4 K/UL (ref 2–7.8)
NITRITE UR QL STRIP.AUTO: NEGATIVE
PH UR STRIP: 5 [PH] (ref 5–7)
PLATELET # BLD AUTO: 246 K/UL (ref 140–440)
POTASSIUM SERPL-SCNC: 4.4 MMOL/L (ref 3.6–5)
PROT SERPL-MCNC: 7.9 G/DL (ref 6.3–8.2)
PROT UR STRIP-MCNC: NEGATIVE MG/DL
RBC # BLD AUTO: 4.57 M/UL (ref 4.2–6.3)
RBC #/AREA URNS HPF: 0 #/HPF
SODIUM SERPL-SCNC: 141 MMOL/L (ref 137–145)
SP GR UR REFRACTOMETRY: 1.02 (ref 1–1.03)
SQUAMOUS EPITHELIAL CELLS: ABNORMAL
TSH SERPL DL<=0.05 MIU/L-ACNC: 2.24 UIU/ML (ref 0.34–5.6)
UROBILINOGEN UR QL STRIP.AUTO: NEGATIVE
WBC # BLD AUTO: 6.1 K/UL (ref 4.1–10.9)
WBC URNS QL MICRO: 0 #/HPF

## 2020-08-03 PROCEDURE — 85025 COMPLETE CBC W/AUTO DIFF WBC: CPT | Performed by: INTERNAL MEDICINE

## 2020-08-03 PROCEDURE — 36415 COLL VENOUS BLD VENIPUNCTURE: CPT | Performed by: INTERNAL MEDICINE

## 2020-08-03 PROCEDURE — 84443 ASSAY THYROID STIM HORMONE: CPT | Performed by: INTERNAL MEDICINE

## 2020-08-03 PROCEDURE — 82306 VITAMIN D 25 HYDROXY: CPT | Performed by: INTERNAL MEDICINE

## 2020-08-03 PROCEDURE — 99396 PREV VISIT EST AGE 40-64: CPT | Performed by: INTERNAL MEDICINE

## 2020-08-03 PROCEDURE — 80053 COMPREHEN METABOLIC PANEL: CPT | Performed by: INTERNAL MEDICINE

## 2020-08-03 PROCEDURE — 81001 URINALYSIS AUTO W/SCOPE: CPT | Performed by: INTERNAL MEDICINE

## 2020-08-03 RX ORDER — ALPRAZOLAM 0.5 MG/1
0.5 TABLET ORAL AS NEEDED
Qty: 30 TAB | Refills: 1 | Status: SHIPPED | OUTPATIENT
Start: 2020-08-03 | End: 2021-11-26 | Stop reason: SDUPTHER

## 2020-08-03 RX ORDER — SCOLOPAMINE TRANSDERMAL SYSTEM 1 MG/1
1 PATCH, EXTENDED RELEASE TRANSDERMAL
Qty: 4 PATCH | Refills: 0 | Status: SHIPPED | OUTPATIENT
Start: 2020-08-03 | End: 2022-01-28 | Stop reason: ALTCHOICE

## 2020-08-03 RX ORDER — CARBAMAZEPINE 200 MG/1
TABLET ORAL
Qty: 225 TAB | Refills: 3 | Status: SHIPPED | OUTPATIENT
Start: 2020-08-03 | End: 2021-08-17 | Stop reason: SDUPTHER

## 2020-08-03 RX ORDER — SUMATRIPTAN 100 MG/1
TABLET, FILM COATED ORAL
Qty: 9 TAB | Refills: 2 | Status: SHIPPED | OUTPATIENT
Start: 2020-08-03 | End: 2020-11-25 | Stop reason: SDUPTHER

## 2020-08-03 NOTE — LETTER
Name:Betty Billings :1970 MR #:[id number] Provider Svetlana Najera MD  
*CNSS-998* BSMG-491 () Page 1 of 5 Initial [employer] CONTROLLED SUBSTANCE AGREEMENT I may be prescribed medications that are controlled substances as part  of my treatment plan for management of my medical condition(s). The goal of my treatment plan is to maintain and/or improve my health and wellbeing. Because controlled substances have an increased risk of abuse or harm, continual re-evaluation is needed determine if the goals of my treatment plan are being met for my safety and the safety of others. Yanna Monique  am entering into this Controlled Substance Agreement with my provider, Brenda Batista MD at [address] [place] [address] . I understand that successful treatment requires mutual trust and honesty between me and my provider. I understand that there are state and federal laws and regulations which apply to the medications that my provider may prescribe that must be followed. I understand there are risks and benefits ts of taking the medicines that my provider may prescribe. I understand and agree that following this Agreement is necessary in continuing my provider-patient relationship and success of my treatment plan. As a part of my treatment plan, I agree to the following: COMMUNICATION: 
 
1. I will communicate fully with my provider about my medical condition(s), including the effect on my daily life and how well my medications are helping. I will tell my provider all of the medications that I take for any reason, including medications I receive from another health care provider, and will notify my provider about all issues, problems or concerns, including any side effects, which may be related to my medications.  
 
I understand that this information allows my provider to adjust my treatment plan to help manage my medical condition. I understand that this information will become part of my permanent medical record. 2. I will notify my provider if I have a history of alcohol/drug misuse/addiction or if I have had treatment for alcohol/drug addiction in the past, or if I have a new problem with or concern about alcohol/drug use/addiction, because this increases the likelihood of high risk behaviors and may lead to serious medical conditions. 3. Females Only: I will notify my provider if I am or become pregnant, or if I intend to become pregnant, or if I intend to breastfeed. I understand that communication of these issues with my provider is important, due to possible effects my medication could have on an unborn fetus or breastfeeding child. Name:. Claudeen Cons :1970 MR #:[id number] Provider Nathalie Phillips MD  
*RRJX-190* BSMG-491 () Page 2 of 5 Initial [employer] MISUSE OF MEDICATIONS / DRUGS: 
 
1. I agree to take all controlled substances as prescribed, and will not misuse or abuse any controlled substances prescribed by my provider. For my safety, I will not increase the amount of medicine I take without first talking with and getting permission from my provider. 2. If I have a medical emergency, another health care provider may prescribe me medication. If I seek emergency treatment, I will notify my provider within seventy-two (72) hours. 3. I understand that my provider may discuss my use and/or possible misuse/abuse of controlled substances and alcohol, as appropriate, with any health care provider involved in my care, pharmacist or legal authority. ILLEGAL DRUGS: 
 
1. I will not use illegal drugs of any kind, including but not limited to marijuana, heroin, cocaine, or any prescription drug which is not prescribed to me.  
 
DRUG DIVERSION / PRESCRIPTION FRAUD: 
 
 1. I will not share, sell, trade, give away, or otherwise misuse my prescriptions or medications. 2. I will not alter any prescriptions provided to me by my provider. SINGLE PROVIDER: 
 
1. I agree that all controlled substances that I take will be prescribed only by my provider (or his/her covering provider) under this Agreement. This agreement does not prevent me from seeking emergency medical treatment or receiving pain management related to a surgery. PROTECTING MEDICATIONS: 
 
1. I am responsible for keeping my prescriptions and medications in a safe and secure place including safeguarding them from loss or theft. I understand that lost, stolen or damaged/destroyed prescriptions or medications will not be replaced. Name:Rikki Miles :1970 MR #:[id number] Provider Tanesha Carvajal MD  
*UFXQ-393* BSMG-491 () Page 3 of 5 Initial [employer] PRESCRIPTION RENEWALS/REFILLS: 
 
 
1. I authorize my provider and my pharmacy to cooperate fully with any local, state, or federal law enforcement agency in the investigation of any possible misuse, sale, or other diversion of my controlled substance prescriptions or medications. RISKS: 
 
 
1. I understand that if I do not adhere to this Agreement in any way, my provider may change my prescriptions, stop prescribing controlled substances or end our provider-patient relationship. 2. If my provider decides to stop prescribing medication, or decides to end our provider-patient relationship,my provider may require that I taper my medications slowly. If necessary, my provider may also provide a prescription for other medications to treat my withdrawal symptoms. UNDERSTANDING THIS AGREEMENT: 
 
I understand that my provider may adjust or stop my prescriptions for controlled substances based on my medical condition and my treatment plan. I understand that this Agreement does not guarantee that I will be prescribed medications or controlled substances. I understand that controlled substances may be just one part 
of my treatment plan. My initial on each page and my signature below shows that I have read each page of this Agreement, I have had an opportunity to ask questions, and all of my questions have been answered to my satisfaction by my provider. By signing below, I agree to comply with this Agreement, and I understand that if I do not follow the Agreements listed above, my provider may stop 
 
 
 
_________________________________________  Date/Time 8/3/2020 10:23 AM   
             (Patient Signature)

## 2020-08-03 NOTE — PROGRESS NOTES
Subjective: This note will not be viewable in [address]        45 y.o. female for annual Comprehensive personal healthcare examination. Mrs. Tomas Conrad presents to the office today for complete checkup. She is a 58-year-old  female followed for the following issues    The patient has a seizure disorder for which she has been on Tegretol over a long period of time. She has been compliant with her medications and reports no seizures over the last year. She has had no long-term side effects. She has periodic migraines which seem to be worse during the school year. She does use Imitrex for this but has not had to use as much as she has been at home during the pandemic. She does have anxiety for which she periodically takes Xanax. She finds that this is effective but she does not use it frequently. She has had no side effects related to its usage. She has a history of vitamin D deficiency and is currently taking an over-the-counter supplement. She will be due to have this level checked today. She is also had some vestibular dysfunction in the past especially when she is on a boat. She requests a refill of Transderm-Scop patches to be used on a as needed basis for travel. History of present illness: This patient has multiple medical problems.   These include:   Patient Active Problem List   Diagnosis Code    Anxiety F41.9    Osteoarthritis of multiple joints M15.9    Migraine headache G43.909    Seizure disorder ([facility] [address] G40.909    Vestibular dysfunction H83.2X9    Scalp psoriasis L40.9    Intrinsic eczema L20.84    Vitamin D deficiency E55.9          Past Medical History:   Diagnosis Date    Anxiety 3/19/2018    Migraine headache 3/19/2018    Osteoarthritis of multiple joints 3/19/2018    Scalp psoriasis 3/27/2018    Seizure disorder ([facility] [address] 3/19/2018    Vestibular dysfunction 3/19/2018     Past Surgical History:   Procedure Laterality Date    HX TONSILLECTOMY       No Known Allergies  Current Outpatient Medications   Medication Sig Dispense Refill    carBAMazepine (TEGretol) 200 mg tablet TAKE 1 TABLET DAILY AND THEN ONE AND ONE-HALF TABLETS AT NIGHT 225 Tab 3    SUMAtriptan (IMITREX) 100 mg tablet TAKE 1 TABLET ONCE AS NEEDED FOR MIGRAINE UP TO 1 DOSE 9 Tab 2    ALPRAZolam (Xanax) 0.5 mg tablet Take 1 Tab by mouth as needed for Anxiety (for dizziness). Max Daily Amount: 48 mg. 30 Tab 1    scopolamine (TRANSDERM-SCOP) 1 mg over 3 days pt3d 1 Patch by TransDERmal route every seventy-two (72) hours. 4 Patch 0     Social History     Socioeconomic History    Marital status:      Spouse name: Not on file    Number of children: 2    Years of education: Not on file    Highest education level: Not on file   Occupational History    Occupation:    Tobacco Use    Smoking status: Never Smoker    Smokeless tobacco: Never Used   Substance and Sexual Activity    Alcohol use: Yes     Alcohol/week: 3.0 standard drinks     Types: 3 Glasses of wine per week    Drug use: No     Family History   Problem Relation Age of Onset    Arthritis-osteo Mother     Heart Attack Father     Hypertension Father     Breast Cancer Maternal Aunt        Health Maintenance   Topic Date Due    DTaP/Tdap/Td series (1 - Tdap) 03/06/1990    PAP AKA CERVICAL CYTOLOGY  03/06/1990    Shingrix Vaccine Age 50> (1 of 2) 03/06/2019    Breast Cancer Screen Mammogram  03/06/2019    Influenza Age 9 to Adult  08/01/2020    Colonoscopy  07/09/2022    Lipid Screen  04/15/2024    Pneumococcal 0-64 years  Aged Out       Review of Systems  Constitutional:  She denies fever, weight loss, sweats or fatigue. HEENT:  No blurred or double vision, headache or dizziness. No difficulty with swallowing, taste, speech or smell. Respiratory:  No cough, wheezing or shortness of breath. No sputum production.   Cardiac:  Denies chest pain, palpitations, unexplained indigestion, syncope, edema, PND or orthopnea. GI:  No changes in bowel movements, no abdominal pain, no bloating, anorexia, nausea, vomiting or heartburn. :  No frequency or dysuria. Denies incontinence. Extremities:  No joint pain, stiffness or swelling. Skin:  No recent rashes or mole changes. Neurological:  No numbness, tingling, burning paresthesias or loss of motor strength. No syncope, dizziness, frequent headaches or memory loss. ROS otherwise negative      Objective:     Vitals:    08/03/20 1021   BP: 120/78   Pulse: 78   Resp: 16   Temp: 98.1 °F (36.7 °C)   TempSrc: Oral   SpO2: 99%   Weight: 165 lb 6.4 oz (75 kg)   Height: 5' 6\" (1.676 m)   PainSc:   0 - No pain     Body mass index is 26.7 kg/m². Physical Examination:   General Appearance:  Well-developed, well-nourished, no acute distress. Vision:  Deferred to ophthalmologist.       HEENT:    Ears:  The TMs and ear canals were clear. Eyes:  The pupillary responses were normal.  Extraocular muscle function intact. Lids and conjunctiva not injected. No conjunctiva redness or drainage. Pharynx:  Clear with teeth in good repair. No masses were noted. Neck:  Supple without thyromegaly or adenopathy. No JVD noted. No carotid bruits. Lungs:  Clear to auscultation and percussion. Cardiac:  Regular rate and rhythm without murmur. PMI not displaced. No gallop, rub or click. Abdomen:  Flat, soft, non-tender without palpable organomegaly or mass. No pulsatile mass was felt, and no bruit was heard. Bowel sounds were active. Breasts:  Deferred to GYN  GYN: Deferred to GYN    Rectal exam: Deferred to GYN    Extremities:  No clubbing, cyanosis or edema. Pulses:  Dorsalis pedis and posterior tibial pulses felt without difficulty. Skin:  No rash or unusual mole changes noted. Lymph Nodes:  None felt in the cervical, supraclavicular, axillary or inguinal region. Neurological:  Cranial nerves II-XII grossly intact. Motor strength 5/5. DTRs 2+ and symmetric.   Station and gait normal.  Physical exam otherwise negative        Assessment/Plan:     Diagnoses and all orders for this visit:    Routine physical examination  -     LIPID PANEL  -     COLLECTION VENOUS BLOOD,VENIPUNCTURE  -     CBC WITH AUTOMATED DIFF  -     METABOLIC PANEL, COMPREHENSIVE  -     TSH 3RD GENERATION  -     VITAMIN D, 25 HYDROXY  -     URINALYSIS W/MICROSCOPIC    Seizure disorder (HCC)  -     carBAMazepine (TEGretol) 200 mg tablet; TAKE 1 TABLET DAILY AND THEN ONE AND ONE-HALF TABLETS AT NIGHT, Normal, Disp-225 Tab,R-3  -     CARBAMAZEPINE    Vitamin D deficiency    Primary osteoarthritis involving multiple joints    Scalp psoriasis    Migraine without status migrainosus, not intractable, unspecified migraine type  -     SUMAtriptan (IMITREX) 100 mg tablet; TAKE 1 TABLET ONCE AS NEEDED FOR MIGRAINE UP TO 1 DOSE, Normal, Disp-9 Tab,R-2    Anxiety  -     ALPRAZolam (Xanax) 0.5 mg tablet; Take 1 Tab by mouth as needed for Anxiety (for dizziness). Max Daily Amount: 48 mg., Normal, Disp-30 Tab,R-1    Other orders  -     scopolamine (TRANSDERM-SCOP) 1 mg over 3 days pt3d; 1 Patch by TransDERmal route every seventy-two (72) hours. , Normal, Disp-4 Patch,R-0        Other instructions: The patient's medications were reviewed and reconciled. No change in her current medical regimen will be made. A prudent diet and exercise is encouraged. Health maintenance issues were reviewed and she is up-to-date on Pap testing and mammography and I have asked for a copy of her mammogram report to be forwarded to us. Age-appropriate vaccinations were reviewed and influenza vaccination, Tdap have been recommended. She states that she has already received the shingles vaccine and will find these dates for us. Await results of multiple labs    Follow-up yearly    Follow-up and Dispositions    · Return in about 1 year (around 8/3/2021).          Marley Palma MD     Please note that this dictation was completed with Dragon, the computer voice recognition software. Quite often unanticipated grammatical, syntax, homophones, and other interpretive errors are inadvertently transcribed by the computer software. Please disregard these errors. Please excuse any errors that have escaped final proofreading.

## 2020-08-03 NOTE — PATIENT INSTRUCTIONS

## 2020-08-03 NOTE — PROGRESS NOTES
Sulma Lofton is a 45 y.o. female presenting for Complete Physical  .     1. Have you been to the ER, urgent care clinic since your last visit? Hospitalized since your last visit? No    2. Have you seen or consulted any other health care providers outside of the [address] [place] [address] since your last visit? Include any pap smears or colon screening. No    No flowsheet data found. No flowsheet data found. 3 most recent PHQ Screens 8/3/2020   Little interest or pleasure in doing things Not at all   Feeling down, depressed, irritable, or hopeless Not at all   Total Score PHQ 2 0       There are no discontinued medications.

## 2020-08-05 LAB
CARBAMAZEPINE SERPL-MCNC: 10.8 UG/ML (ref 4–12)
CHOLEST SERPL-MCNC: 210 MG/DL (ref 100–199)
HDLC SERPL-MCNC: 99 MG/DL
LDLC SERPL CALC-MCNC: 87 MG/DL (ref 0–99)
TRIGL SERPL-MCNC: 118 MG/DL (ref 0–149)
VLDLC SERPL CALC-MCNC: 24 MG/DL (ref 5–40)

## 2020-11-25 DIAGNOSIS — G43.909 MIGRAINE WITHOUT STATUS MIGRAINOSUS, NOT INTRACTABLE, UNSPECIFIED MIGRAINE TYPE: ICD-10-CM

## 2020-11-25 NOTE — TELEPHONE ENCOUNTER
PCP: Max Hodgkin, MD    Last appt: 8/3/2020  No future appointments.     Requested Prescriptions     Pending Prescriptions Disp Refills    SUMAtriptan (IMITREX) 100 mg tablet 9 Tab 2     Sig: TAKE 1 TABLET ONCE AS NEEDED FOR MIGRAINE UP TO 1 DOSE       Prior labs and Blood pressures:  BP Readings from Last 3 Encounters:   08/03/20 120/78   06/02/20 122/80   08/01/19 116/78     Lab Results   Component Value Date/Time    Sodium 141 08/03/2020 02:15 PM    Potassium 4.4 08/03/2020 02:15 PM    Chloride 103 08/03/2020 02:15 PM    CO2 25.0 08/03/2020 02:15 PM    Anion gap 13 08/03/2020 02:15 PM    Glucose 89 08/03/2020 02:15 PM    BUN 15.0 08/03/2020 02:15 PM    Creatinine 0.6 (L) 08/03/2020 02:15 PM    BUN/Creatinine ratio 25 08/03/2020 02:15 PM    GFR est AA >60 08/03/2020 02:15 PM    GFR est non-AA >60 08/03/2020 02:15 PM    Calcium 9.1 08/03/2020 02:15 PM     Lab Results   Component Value Date/Time    Hemoglobin A1c (POC) 4.6 03/27/2018 03:49 PM     Lab Results   Component Value Date/Time    Cholesterol, total 210 (H) 08/03/2020 10:49 AM    Cholesterol (POC) 220.0 (A) 03/27/2018 03:49 PM    HDL Cholesterol 99 08/03/2020 10:49 AM    HDL Cholesterol (POC) 122.0 03/27/2018 03:49 PM    LDL Cholesterol (POC) 79.4 03/27/2018 03:49 PM    LDL, calculated 87 08/03/2020 10:49 AM    VLDL, calculated 24 08/03/2020 10:49 AM    Triglyceride 118 08/03/2020 10:49 AM    Triglycerides (POC) 93.0 03/27/2018 03:49 PM    CHOL/HDL Ratio 2 04/15/2019 03:09 PM     Lab Results   Component Value Date/Time    VITAMIN D, 25-HYDROXY 50 08/03/2020 02:15 PM       Lab Results   Component Value Date/Time    TSH, 3rd generation 2.24 08/03/2020 02:15 PM

## 2020-11-25 NOTE — TELEPHONE ENCOUNTER
Requested Prescriptions     Pending Prescriptions Disp Refills    SUMAtriptan (IMITREX) 100 mg tablet 9 Tab 2     Sig: TAKE 1 TABLET ONCE AS NEEDED FOR MIGRAINE UP TO 1 DOSE     Patient is out of town and forgot her medication.   Last Refill: 8/3/20  Next Appointment:8/3/20

## 2020-11-26 RX ORDER — SUMATRIPTAN 100 MG/1
TABLET, FILM COATED ORAL
Qty: 9 TAB | Refills: 2 | Status: SHIPPED | OUTPATIENT
Start: 2020-11-26 | End: 2021-03-28

## 2020-12-28 ENCOUNTER — TELEPHONE (OUTPATIENT)
Dept: INTERNAL MEDICINE CLINIC | Age: 50
End: 2020-12-28

## 2020-12-28 NOTE — TELEPHONE ENCOUNTER
Patient has a feeling of electrical current going to her pinky toe, especially at night. Please advise.     [phone]

## 2020-12-28 NOTE — TELEPHONE ENCOUNTER
Generally this is a symptom of a nerve being irritated in that area. This can sometimes occur if a shoe fits too tight and presses a nerve in her foot.   She may need to see a foot specialist to have this further evaluated

## 2021-07-28 ENCOUNTER — TELEPHONE (OUTPATIENT)
Dept: INTERNAL MEDICINE CLINIC | Age: 51
End: 2021-07-28

## 2021-07-28 DIAGNOSIS — G43.909 MIGRAINE WITHOUT STATUS MIGRAINOSUS, NOT INTRACTABLE, UNSPECIFIED MIGRAINE TYPE: ICD-10-CM

## 2021-07-28 RX ORDER — MUPIROCIN CALCIUM 20 MG/G
CREAM TOPICAL 2 TIMES DAILY
Qty: 15 G | Refills: 0 | Status: SHIPPED | OUTPATIENT
Start: 2021-07-28 | End: 2021-08-17 | Stop reason: ALTCHOICE

## 2021-07-28 RX ORDER — SUMATRIPTAN 100 MG/1
TABLET, FILM COATED ORAL
Qty: 9 TABLET | Refills: 1 | Status: SHIPPED | OUTPATIENT
Start: 2021-07-28 | End: 2022-06-03 | Stop reason: SDUPTHER

## 2021-07-28 NOTE — TELEPHONE ENCOUNTER
Patient states she cut her leg about 2 weeks ago on a piece of plastic. It is about 1/4 inch of redness around it. It is not hot to touch. Please advise.     [phone]

## 2021-07-28 NOTE — TELEPHONE ENCOUNTER
Requested Prescriptions     Pending Prescriptions Disp Refills    SUMAtriptan (IMITREX) 100 mg tablet 9 Tablet 3       Last Refill: 3/28/21  Next Appointment:8/17/21

## 2021-07-28 NOTE — TELEPHONE ENCOUNTER
Patient advised. Please send pended Rx to [facility]:  Requested Prescriptions     Pending Prescriptions Disp Refills    mupirocin calcium (BACTROBAN) 2 % topical cream 15 g 0     Sig: Apply  to affected area two (2) times a day.

## 2021-08-17 ENCOUNTER — OFFICE VISIT (OUTPATIENT)
Dept: INTERNAL MEDICINE CLINIC | Age: 51
End: 2021-08-17
Payer: COMMERCIAL

## 2021-08-17 VITALS
RESPIRATION RATE: 20 BRPM | DIASTOLIC BLOOD PRESSURE: 82 MMHG | WEIGHT: 162 LBS | HEIGHT: 66 IN | OXYGEN SATURATION: 98 % | HEART RATE: 80 BPM | TEMPERATURE: 98.1 F | BODY MASS INDEX: 26.03 KG/M2 | SYSTOLIC BLOOD PRESSURE: 136 MMHG

## 2021-08-17 DIAGNOSIS — F41.9 ANXIETY: ICD-10-CM

## 2021-08-17 DIAGNOSIS — G40.909 SEIZURE DISORDER (HCC): ICD-10-CM

## 2021-08-17 DIAGNOSIS — Z11.59 NEED FOR HEPATITIS C SCREENING TEST: ICD-10-CM

## 2021-08-17 DIAGNOSIS — E55.9 VITAMIN D DEFICIENCY: ICD-10-CM

## 2021-08-17 DIAGNOSIS — Z00.00 ROUTINE PHYSICAL EXAMINATION: Primary | ICD-10-CM

## 2021-08-17 DIAGNOSIS — G43.909 MIGRAINE WITHOUT STATUS MIGRAINOSUS, NOT INTRACTABLE, UNSPECIFIED MIGRAINE TYPE: ICD-10-CM

## 2021-08-17 LAB
APPEARANCE UR: CLEAR
BILIRUB UR QL: NEGATIVE
COLOR UR: NORMAL
GLUCOSE UR STRIP.AUTO-MCNC: NEGATIVE MG/DL
HGB UR QL STRIP: NEGATIVE
KETONES UR QL STRIP.AUTO: NEGATIVE MG/DL
LEUKOCYTE ESTERASE UR QL STRIP.AUTO: NEGATIVE
NITRITE UR QL STRIP.AUTO: NEGATIVE
PH UR STRIP: 6 [PH] (ref 5–8)
PROT UR STRIP-MCNC: NEGATIVE MG/DL
SP GR UR REFRACTOMETRY: 1.01 (ref 1–1.03)
UROBILINOGEN UR QL STRIP.AUTO: 0.2 EU/DL (ref 0.2–1)

## 2021-08-17 PROCEDURE — 99396 PREV VISIT EST AGE 40-64: CPT | Performed by: INTERNAL MEDICINE

## 2021-08-17 RX ORDER — CARBAMAZEPINE 200 MG/1
TABLET ORAL
Qty: 225 TABLET | Refills: 1 | Status: SHIPPED | OUTPATIENT
Start: 2021-08-17 | End: 2022-03-11

## 2021-08-17 RX ORDER — CARBAMAZEPINE 200 MG/1
TABLET ORAL
Qty: 225 TABLET | Refills: 1 | Status: SHIPPED | OUTPATIENT
Start: 2021-08-17 | End: 2021-08-17 | Stop reason: SDUPTHER

## 2021-08-17 RX ORDER — SCOLOPAMINE TRANSDERMAL SYSTEM 1 MG/1
1 PATCH, EXTENDED RELEASE TRANSDERMAL
Qty: 4 PATCH | Refills: 0 | Status: CANCELLED | OUTPATIENT
Start: 2021-08-17

## 2021-08-17 NOTE — PATIENT INSTRUCTIONS
DASH Diet: Care Instructions  Your Care Instructions     The DASH diet is an eating plan that can help lower your blood pressure. DASH stands for Dietary Approaches to Stop Hypertension. Hypertension is high blood pressure. The DASH diet focuses on eating foods that are high in calcium, potassium, and magnesium. These nutrients can lower blood pressure. The foods that are highest in these nutrients are fruits, vegetables, low-fat dairy products, nuts, seeds, and legumes. But taking calcium, potassium, and magnesium supplements instead of eating foods that are high in those nutrients does not have the same effect. The DASH diet also includes whole grains, fish, and poultry. The DASH diet is one of several lifestyle changes your doctor may recommend to lower your high blood pressure. Your doctor may also want you to decrease the amount of sodium in your diet. Lowering sodium while following the DASH diet can lower blood pressure even further than just the DASH diet alone. Follow-up care is a key part of your treatment and safety. Be sure to make and go to all appointments, and call your doctor if you are having problems. It's also a good idea to know your test results and keep a list of the medicines you take. How can you care for yourself at home? Following the DASH diet  · Eat 4 to 5 servings of fruit each day. A serving is 1 medium-sized piece of fruit, ½ cup chopped or canned fruit, 1/4 cup dried fruit, or 4 ounces (½ cup) of fruit juice. Choose fruit more often than fruit juice. · Eat 4 to 5 servings of vegetables each day. A serving is 1 cup of lettuce or raw leafy vegetables, ½ cup of chopped or cooked vegetables, or 4 ounces (½ cup) of vegetable juice. Choose vegetables more often than vegetable juice. · Get 2 to 3 servings of low-fat and fat-free dairy each day. A serving is 8 ounces of milk, 1 cup of yogurt, or 1 ½ ounces of cheese. · Eat 6 to 8 servings of grains each day.  A serving is 1 slice of bread, 1 ounce of dry cereal, or ½ cup of cooked rice, pasta, or cooked cereal. Try to choose whole-grain products as much as possible. · Limit lean meat, poultry, and fish to 2 servings each day. A serving is 3 ounces, about the size of a deck of cards. · Eat 4 to 5 servings of nuts, seeds, and legumes (cooked dried beans, lentils, and split peas) each week. A serving is 1/3 cup of nuts, 2 tablespoons of seeds, or ½ cup of cooked beans or peas. · Limit fats and oils to 2 to 3 servings each day. A serving is 1 teaspoon of vegetable oil or 2 tablespoons of salad dressing. · Limit sweets and added sugars to 5 servings or less a week. A serving is 1 tablespoon jelly or jam, ½ cup sorbet, or 1 cup of lemonade. · Eat less than 2,300 milligrams (mg) of sodium a day. If you limit your sodium to 1,500 mg a day, you can lower your blood pressure even more. · Be aware that all of these are the suggested number of servings for people who eat 1,800 to 2,000 calories a day. Your recommended number of servings may be different if you need more or fewer calories. Tips for success  · Start small. Do not try to make dramatic changes to your diet all at once. You might feel that you are missing out on your favorite foods and then be more likely to not follow the plan. Make small changes, and stick with them. Once those changes become habit, add a few more changes. · Try some of the following:  ? Make it a goal to eat a fruit or vegetable at every meal and at snacks. This will make it easy to get the recommended amount of fruits and vegetables each day. ? Try yogurt topped with fruit and nuts for a snack or healthy dessert. ? Add lettuce, tomato, cucumber, and onion to sandwiches. ? Combine a ready-made pizza crust with low-fat mozzarella cheese and lots of vegetable toppings. Try using tomatoes, squash, spinach, broccoli, carrots, cauliflower, and onions. ?  Have a variety of cut-up vegetables with a low-fat dip as an appetizer instead of chips and dip. ? Sprinkle sunflower seeds or chopped almonds over salads. Or try adding chopped walnuts or almonds to cooked vegetables. ? Try some vegetarian meals using beans and peas. Add garbanzo or kidney beans to salads. Make burritos and tacos with mashed cameron beans or black beans. Where can you learn more? Go to http://www.faye.com/  Enter H967 in the search box to learn more about \"DASH Diet: Care Instructions. \"  Current as of: August 31, 2020               Content Version: 12.8  © 4559-4864 [employer]. Care instructions adapted under license by [employer] (which disclaims liability or warranty for this information). If you have questions about a medical condition or this instruction, always ask your healthcare professional. [address] any warranty or liability for your use of this information.

## 2021-08-17 NOTE — PROGRESS NOTES
Jackie Greer is a 45 y.o. female presenting for Complete Physical  .     1. Have you been to the ER, urgent care clinic since your last visit? Hospitalized since your last visit? No    2. Have you seen or consulted any other health care providers outside of the [address] [place] [address] since your last visit? Include any pap smears or colon screening. GYN    No flowsheet data found. No flowsheet data found. 3 most recent PHQ Screens 8/17/2021   Little interest or pleasure in doing things Not at all   Feeling down, depressed, irritable, or hopeless Not at all   Total Score PHQ 2 0       There are no discontinued medications.

## 2021-08-17 NOTE — PROGRESS NOTES
Subjective:     45 y.o. female for annual Comprehensive personal healthcare examination. History of present illness: This patient has multiple medical problems. These include:     Mrs. Huseyin Dunn is a 41-year-old 6  who presents to the office today for complete checkup. Patient's medical history is pertinent for history of anxiety for which she is on alprazolam.  She takes this infrequently. She notes that if she has dizzy spells and becomes anxious that this will generally alleviate both problems. She has had no hangover effect from the medication or impairment. She does have a history of a vestibular dysfunction as well for which she uses scopolamine patches on a as needed basis. The attacks are infrequent but oftentimes precipitated by going out on a boat. She would like to have refills of this. She has a history of seizure disorder dating back 30 years ago. She is chronically been on Tegretol and is due to have her blood level checked. She has had no seizures in the last 30 years. She has a history of migraine headaches which usually occur about twice a month. She uses Imitrex for these on a as needed basis. They seem to be related to hormonal changes. She has vitamin D deficiency and is taken over the counter supplement and is due to have this level checked today.     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    Intrinsic eczema L20.84    Vitamin D deficiency E55.9      Past Medical History:   Diagnosis Date    Anxiety 3/19/2018    Migraine headache 3/19/2018    Osteoarthritis of multiple joints 3/19/2018    Scalp psoriasis 3/27/2018    Seizure disorder ([address] 3/19/2018    Vestibular dysfunction 3/19/2018     Past Surgical History:   Procedure Laterality Date    HX TONSILLECTOMY No Known Allergies  Current Outpatient Medications   Medication Sig Dispense Refill    carBAMazepine (TEGretol) 200 mg tablet TAKE 1 TABLET DAILY AND THEN ONE AND ONE-HALF TABLETS AT NIGHT 225 Tablet 1    SUMAtriptan (IMITREX) 100 mg tablet TAKE 1 TABLET ONCE AS NEEDED FOR MIGRAINE UP TO 1 DOSE 9 Tablet 1    ALPRAZolam (Xanax) 0.5 mg tablet Take 1 Tab by mouth as needed for Anxiety (for dizziness). Max Daily Amount: 48 mg. 30 Tab 1    scopolamine (TRANSDERM-SCOP) 1 mg over 3 days pt3d 1 Patch by TransDERmal route every seventy-two (72) hours. 4 Patch 0     Social History     Socioeconomic History    Marital status:      Spouse name: Not on file    Number of children: 2    Years of education: Not on file    Highest education level: Not on file   Occupational History    Occupation:    Tobacco Use    Smoking status: Never Smoker    Smokeless tobacco: Never Used   Vaping Use    Vaping Use: Never used   Substance and Sexual Activity    Alcohol use: Yes     Alcohol/week: 3.0 standard drinks     Types: 3 Glasses of wine per week    Drug use: No     Social Determinants of Health     Financial Resource Strain:     Difficulty of Paying Living Expenses:    Food Insecurity:     Worried About Running Out of Food in the Last Year:     [address] in the Last Year:    Transportation Needs:     Lack of Transportation (Medical):      Lack of Transportation (Non-Medical):    Physical Activity:     Days of Exercise per Week:     Minutes of Exercise per Session:    Stress:     Feeling of Stress :    Social Connections:     Frequency of Communication with Friends and Family:     Frequency of Social Gatherings with Friends and Family:     Attends Rastafarian Services:     Active Member of Clubs or Organizations:     Attends Club or Organization Meetings:     Marital Status:      Family History   Problem Relation Age of Onset    Arthritis-osteo Mother     Heart Attack Father  Hypertension Father     Breast Cancer Maternal Aunt        Health Maintenance   Topic Date Due    Hepatitis C Screening  Never done    COVID-19 Vaccine (1) Never done    DTaP/Tdap/Td series (1 - Tdap) Never done    PAP AKA CERVICAL CYTOLOGY  Never done    Flu Vaccine (1) 09/01/2021    Colorectal Cancer Screening Combo  07/09/2022    Breast Cancer Screen Mammogram  07/21/2022    Lipid Screen  08/03/2025    Shingrix Vaccine Age 50>  Completed    Pneumococcal 0-64 years  Aged Out       Review of Systems  Constitutional:  She denies fever, weight loss, sweats or fatigue. HEENT:  No blurred or double vision, headache or dizziness. No difficulty with swallowing, taste, speech or smell. Respiratory:  No cough, wheezing or shortness of breath. No sputum production. Cardiac:  Denies chest pain, palpitations, unexplained indigestion, syncope, edema, PND or orthopnea. GI:  No changes in bowel movements, no abdominal pain, no bloating, anorexia, nausea, vomiting or heartburn. :  No frequency or dysuria. Denies incontinence. Extremities:  No joint pain, stiffness or swelling. Skin:  No recent rashes or mole changes. Neurological:  No numbness, tingling, burning paresthesias or loss of motor strength. No syncope, dizziness, frequent headaches or memory loss. ROS otherwise negative      Objective:     Vitals:    08/17/21 1355 08/17/21 1429   BP: (!) 140/82 136/82   Pulse: 80    Resp: 20    Temp: 98.1 °F (36.7 °C)    TempSrc: Oral    SpO2: 98%    Weight: 162 lb (73.5 kg)    Height: 5' 6\" (1.676 m)    PainSc:   0 - No pain      Body mass index is 26.15 kg/m². Physical Examination:   General Appearance:  Well-developed, well-nourished, no acute distress. Vision:  Deferred to ophthalmologist.       HEENT:    Ears:  The TMs and ear canals were clear. Eyes:  The pupillary responses were normal.  Extraocular muscle function intact. Lids and conjunctiva not injected.   No conjunctiva redness or drainage. Pharynx:  Clear with teeth in good repair. No masses were noted. Neck:  Supple without thyromegaly or adenopathy. No JVD noted. No carotid bruits. Lungs:  Clear to auscultation and percussion. Cardiac:  Regular rate and rhythm without murmur. PMI not displaced. No gallop, rub or click. Abdomen:  Flat, soft, non-tender without palpable organomegaly or mass. No pulsatile mass was felt, and no bruit was heard. Bowel sounds were active. Breasts:  Deferred to GYN  GYN: Deferred to GYN    Rectal exam: Deferred to GYN    Extremities:  No clubbing, cyanosis or edema. Pulses:  Dorsalis pedis and posterior tibial pulses felt without difficulty. Skin:  No rash or unusual mole changes noted. Lymph Nodes:  None felt in the cervical, supraclavicular, axillary or inguinal region. Neurological:  Cranial nerves II-XII grossly intact. Motor strength 5/5. DTRs 2+ and symmetric. Station and gait normal.  Physical exam otherwise negative        Assessment/Plan:     Diagnoses and all orders for this visit:    Routine physical examination  -     COLLECTION VENOUS BLOOD,VENIPUNCTURE  -     CBC WITH AUTOMATED DIFF; Future  -     LIPID PANEL; Future  -     METABOLIC PANEL, COMPREHENSIVE; Future  -     TSH 3RD GENERATION; Future  -     VITAMIN D, 25 HYDROXY; Future  -     CARBAMAZEPINE; Future  -     HEPATITIS C AB; Future    Seizure disorder (HCC)  -     Discontinue: carBAMazepine (TEGretol) 200 mg tablet; TAKE 1 TABLET DAILY AND THEN ONE AND ONE-HALF TABLETS AT NIGHT, Normal, Disp-225 Tablet, R-1  -     CARBAMAZEPINE; Future  -     carBAMazepine (TEGretol) 200 mg tablet; TAKE 1 TABLET DAILY AND THEN ONE AND ONE-HALF TABLETS AT NIGHT, Normal, Disp-225 Tablet, R-1    Migraine without status migrainosus, not intractable, unspecified migraine type    Anxiety    Vitamin D deficiency    Need for hepatitis C screening test  -     VITAMIN D, 25 HYDROXY; Future  -     HEPATITIS C AB;  Future    Other orders  - Cancel: scopolamine (TRANSDERM-SCOP) 1 mg over 3 days pt3d; 1 Patch by TransDERmal route every seventy-two (72) hours. , Normal, Disp-4 Patch, R-0        Other instructions: The patient's medications were reviewed and reconciled. No change in her current medical regimen will be made. A no added salt, prudent diet is encouraged    Regular monitoring of blood pressure recommended as her readings are borderline and her father had a family history of hypertension    Health maintenance issues were reviewed and CDC recommended hepatitis C testing will be obtained. She is up-to-date on Covid-19 vaccination. She is due to have Pap testing done by her gynecologist.  Marie Rosen Tdap vaccination has been recommended. Await results of multiple labs    Follow-up yearly    Follow-up and Dispositions    · Return in about 1 year (around 8/17/2022). Maia Ng MD     Please note that this dictation was completed with [employer], the computer voice recognition software. Quite often unanticipated grammatical, syntax, homophones, and other interpretive errors are inadvertently transcribed by the computer software. Please disregard these errors. Please excuse any errors that have escaped final proofreading.

## 2021-08-18 LAB
25(OH)D3 SERPL-MCNC: 39.8 NG/ML (ref 30–100)
ALBUMIN SERPL-MCNC: 3.9 G/DL (ref 3.5–5)
ALBUMIN/GLOB SERPL: 1 {RATIO} (ref 1.1–2.2)
ALP SERPL-CCNC: 71 U/L (ref 45–117)
ALT SERPL-CCNC: 19 U/L (ref 12–78)
ANION GAP SERPL CALC-SCNC: 4 MMOL/L (ref 5–15)
AST SERPL-CCNC: 13 U/L (ref 15–37)
BACTERIA SPEC CULT: NORMAL
BASOPHILS # BLD: 0 K/UL (ref 0–0.1)
BASOPHILS NFR BLD: 1 % (ref 0–1)
BILIRUB SERPL-MCNC: 0.4 MG/DL (ref 0.2–1)
BUN SERPL-MCNC: 11 MG/DL (ref 6–20)
BUN/CREAT SERPL: 14 (ref 12–20)
CALCIUM SERPL-MCNC: 8.5 MG/DL (ref 8.5–10.1)
CARBAMAZEPINE SERPL-MCNC: 9.1 UG/ML (ref 4–12)
CHLORIDE SERPL-SCNC: 106 MMOL/L (ref 97–108)
CHOLEST SERPL-MCNC: 238 MG/DL
CO2 SERPL-SCNC: 26 MMOL/L (ref 21–32)
CREAT SERPL-MCNC: 0.79 MG/DL (ref 0.55–1.02)
DIFFERENTIAL METHOD BLD: ABNORMAL
EOSINOPHIL # BLD: 0 K/UL (ref 0–0.4)
EOSINOPHIL NFR BLD: 1 % (ref 0–7)
ERYTHROCYTE [DISTWIDTH] IN BLOOD BY AUTOMATED COUNT: 13.2 % (ref 11.5–14.5)
GLOBULIN SER CALC-MCNC: 3.8 G/DL (ref 2–4)
GLUCOSE SERPL-MCNC: 80 MG/DL (ref 65–100)
HCT VFR BLD AUTO: 40.8 % (ref 35–47)
HCV AB SERPL QL IA: NONREACTIVE
HDLC SERPL-MCNC: 85 MG/DL
HDLC SERPL: 2.8 {RATIO} (ref 0–5)
HGB BLD-MCNC: 13 G/DL (ref 11.5–16)
IMM GRANULOCYTES # BLD AUTO: 0 K/UL (ref 0–0.04)
IMM GRANULOCYTES NFR BLD AUTO: 0 % (ref 0–0.5)
LDLC SERPL CALC-MCNC: 139.6 MG/DL (ref 0–100)
LYMPHOCYTES # BLD: 1.7 K/UL (ref 0.8–3.5)
LYMPHOCYTES NFR BLD: 28 % (ref 12–49)
MCH RBC QN AUTO: 32.9 PG (ref 26–34)
MCHC RBC AUTO-ENTMCNC: 31.9 G/DL (ref 30–36.5)
MCV RBC AUTO: 103.3 FL (ref 80–99)
MONOCYTES # BLD: 0.6 K/UL (ref 0–1)
MONOCYTES NFR BLD: 10 % (ref 5–13)
NEUTS SEG # BLD: 3.6 K/UL (ref 1.8–8)
NEUTS SEG NFR BLD: 60 % (ref 32–75)
NRBC # BLD: 0 K/UL (ref 0–0.01)
NRBC BLD-RTO: 0 PER 100 WBC
PLATELET # BLD AUTO: 306 K/UL (ref 150–400)
PMV BLD AUTO: 9.5 FL (ref 8.9–12.9)
POTASSIUM SERPL-SCNC: 4.5 MMOL/L (ref 3.5–5.1)
PROT SERPL-MCNC: 7.7 G/DL (ref 6.4–8.2)
RBC # BLD AUTO: 3.95 M/UL (ref 3.8–5.2)
SERVICE CMNT-IMP: NORMAL
SODIUM SERPL-SCNC: 136 MMOL/L (ref 136–145)
TRIGL SERPL-MCNC: 67 MG/DL (ref ?–150)
TSH SERPL DL<=0.05 MIU/L-ACNC: 1.1 UIU/ML (ref 0.36–3.74)
VLDLC SERPL CALC-MCNC: 13.4 MG/DL
WBC # BLD AUTO: 6 K/UL (ref 3.6–11)

## 2021-08-18 NOTE — PROGRESS NOTES
Labs stable except LDL cholesterol elevated at 140  Would like her to treat with cardiac diet, exercise  Repeat FLP 3 months - if no better would benefit from low dose statin treatment to reduce cardiac risk

## 2021-11-18 ENCOUNTER — LAB ONLY (OUTPATIENT)
Dept: INTERNAL MEDICINE CLINIC | Age: 51
End: 2021-11-18

## 2021-11-18 DIAGNOSIS — E78.00 ELEVATED CHOLESTEROL: Primary | ICD-10-CM

## 2021-11-18 LAB
CHOLEST SERPL-MCNC: 211 MG/DL
HDLC SERPL-MCNC: 79 MG/DL
HDLC SERPL: 2.7 {RATIO} (ref 0–5)
LDLC SERPL CALC-MCNC: 109.2 MG/DL (ref 0–100)
TRIGL SERPL-MCNC: 114 MG/DL (ref ?–150)
VLDLC SERPL CALC-MCNC: 22.8 MG/DL

## 2021-11-26 DIAGNOSIS — F41.9 ANXIETY: ICD-10-CM

## 2021-11-26 RX ORDER — ALPRAZOLAM 0.5 MG/1
0.5 TABLET ORAL AS NEEDED
Qty: 30 TABLET | Refills: 1 | Status: SHIPPED | OUTPATIENT
Start: 2021-11-26

## 2022-01-25 ENCOUNTER — TELEPHONE (OUTPATIENT)
Dept: INTERNAL MEDICINE CLINIC | Age: 52
End: 2022-01-25

## 2022-01-25 NOTE — TELEPHONE ENCOUNTER
----- Message from Timo Jose sent at 1/25/2022  4:34 PM EST -----  Subject: Appointment Request    Reason for Call: Routine (Patient Request) No Script    QUESTIONS  Type of Appointment? Established Patient  Reason for appointment request? Available appointments did not meet   patient need  Additional Information for Provider? Patient is wanting to get [place]   paperwork filled out. Has to be this week. Please call patient back. ---------------------------------------------------------------------------  --------------  Chris CORREA  What is the best way for the office to contact you? OK to leave message on   voicemail  Preferred Call Back Phone Number? [phone]  ---------------------------------------------------------------------------  --------------  SCRIPT ANSWERS  Relationship to Patient? Self  (Is the patient requesting to see the provider for a procedure?)? No  (Is the patient requesting to see the provider urgently  today or   tomorrow. )? No  Have you been diagnosed with, awaiting test results for, or told that you   are suspected of having COVID-19 (Coronavirus)? (If patient has tested   negative or was tested as a requirement for work, school, or travel and   not based on symptoms, answer no)? No  Within the past two weeks have you developed any of the following symptoms   (answer no if symptoms have been present longer than 2 weeks or began   more than 2 weeks ago)? Fever or Chills, Cough, Shortness of breath or   difficulty breathing, Loss of taste or smell, Sore throat, Nasal   congestion, Sneezing or runny nose, Fatigue or generalized body aches   (answer no if pain is specific to a body part e.g. back pain), Diarrhea,   Headache? No  Have you had close contact with someone with COVID-19 in the last 14 days? No  (Service Expert  click yes below to proceed with [employer] As Usual   Scheduling)?  Yes

## 2022-01-28 ENCOUNTER — OFFICE VISIT (OUTPATIENT)
Dept: INTERNAL MEDICINE CLINIC | Age: 52
End: 2022-01-28
Payer: COMMERCIAL

## 2022-01-28 VITALS
HEART RATE: 88 BPM | BODY MASS INDEX: 26.33 KG/M2 | HEIGHT: 66 IN | OXYGEN SATURATION: 99 % | TEMPERATURE: 98.1 F | SYSTOLIC BLOOD PRESSURE: 138 MMHG | DIASTOLIC BLOOD PRESSURE: 90 MMHG | RESPIRATION RATE: 20 BRPM | WEIGHT: 163.8 LBS

## 2022-01-28 DIAGNOSIS — F41.9 ANXIETY: Primary | ICD-10-CM

## 2022-01-28 PROCEDURE — 99213 OFFICE O/P EST LOW 20 MIN: CPT | Performed by: INTERNAL MEDICINE

## 2022-01-28 NOTE — PROGRESS NOTES
Clint Hilario is a 45 y.o. female presenting for Form Completion  . 1. Have you been to the ER, urgent care clinic since your last visit? Hospitalized since your last visit? No    2. Have you seen or consulted any other health care providers outside of the [address] [place] [address] since your last visit? Include any pap smears or colon screening. No    No flowsheet data found. No flowsheet data found. 3 most recent PHQ Screens 1/28/2022   Little interest or pleasure in doing things Not at all   Feeling down, depressed, irritable, or hopeless Not at all   Total Score PHQ 2 0       There are no discontinued medications.

## 2022-01-28 NOTE — LETTER
1/28/2022         RE: Ms. Essence Marquez   Mike    To Whom It May Concern:    Due to an exacerbation of an underlying medical condition, the above named patient will be unable to work the week of January 31st - February 4th.     Respectfully,                Ana Casey MD

## 2022-01-28 NOTE — PROGRESS NOTES
Subjective:     Mrs. Jared Carreon is a 58-year-old 6  at [employer] who presents to the office today with complaints of increased anxiety and panic. The patient has a known history of this and does have Xanax which she takes for attacks. 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 The patient has been fully vaccinated and boosted for Covid but has had an in extreme anxiety about catching Covid. It is affected her ability to do her job and she is looking into early shelter when she meets with administration next week. The patient is seeking FMLA leave in order to address her anxiety/depression and potentially leave her job because of the conditions for which she now has to work. Past Medical History:   Diagnosis Date    Anxiety 3/19/2018    Migraine headache 3/19/2018    Osteoarthritis of multiple joints 3/19/2018    Scalp psoriasis 3/27/2018    Seizure disorder ([facility] [address] 3/19/2018    Vestibular dysfunction 3/19/2018     Past Surgical History:   Procedure Laterality Date    HX TONSILLECTOMY       No Known Allergies  Current Outpatient Medications   Medication Sig Dispense Refill    ALPRAZolam (Xanax) 0.5 mg tablet Take 1 Tablet by mouth as needed for Anxiety (for dizziness).  Max Daily Amount: 48 mg. 30 Tablet 1    carBAMazepine (TEGretol) 200 mg tablet TAKE 1 TABLET DAILY AND THEN ONE AND ONE-HALF TABLETS AT NIGHT 225 Tablet 1    SUMAtriptan (IMITREX) 100 mg tablet TAKE 1 TABLET ONCE AS NEEDED FOR MIGRAINE UP TO 1 DOSE 9 Tablet 1     Social History     Socioeconomic History    Marital status:     Number of children: 2   Occupational History    Occupation:    Tobacco Use    Smoking status: Never Smoker    Smokeless tobacco: Never Used   Vaping Use    Vaping Use: Never used   Substance and Sexual Activity    Alcohol use: Yes     Alcohol/week: 3.0 standard drinks     Types: 3 Glasses of wine per week    Drug use: No     Family History   Problem Relation Age of Onset    OSTEOARTHRITIS Mother     Heart Attack Father     Hypertension Father     Breast Cancer Maternal Aunt        Review of Systems:  GEN: no weight loss, weight gain, fatigue or night sweats  CV: no PND, orthopnea, or palpitations  Resp: no dyspnea on exertion, no cough  Abd: no nausea, vomiting or diarrhea  EXT: denies edema, claudication  Endocrine: no hair loss, excessive thirst or polyuria  Psych: Positive for exacerbation and anxiety and panic  Neurological ROS: no TIA or stroke symptoms  ROS otherwise negative      Objective:     Visit Vitals  BP (!) 138/90 (BP 1 Location: Left upper arm, BP Patient Position: Sitting, BP Cuff Size: Adult)   Pulse 88   Temp 98.1 °F (36.7 °C) (Oral)   Resp 20   Ht 5' 6\" (1.676 m)   Wt 163 lb 12.8 oz (74.3 kg)   SpO2 99%   BMI 26.44 kg/m²     Body mass index is 26.44 kg/m². General:   alert, cooperative and no distress     Physical exam not performed         Assessment/Plan:     Diagnoses and all orders for this visit:    Anxiety        Other instructions: The patient's medications were reviewed and reconciled. The patient is currently suffering with an increase in anxiety bordering on panic as result of changes in her work conditions. She has been taking Xanax on a as needed basis. She does not believe that she can focus under the working circumstances that she has been given. She will be talking with administration in regards to early longterm next week. We will marcin her work excuse because of the exacerbation of her underlying medical condition and complete an FMLA form for 2 weeks of absence if needed. Should her anxiety not improve I have recommended consideration of psychiatric consultation in regards to further medication management.     Follow-up here should there be worsening    Follow-up and Dispositions · Return if symptoms worsen or fail to improve. Mohamud Nevarez MD    Please note that this dictation was completed with [employer], the computer voice recognition software. Quite often unanticipated grammatical, syntax, homophones, and other interpretive errors are inadvertently transcribed by the computer software. Please disregard these errors. Please excuse any errors that have escaped final proofreading.

## 2022-02-16 ENCOUNTER — PATIENT MESSAGE (OUTPATIENT)
Dept: INTERNAL MEDICINE CLINIC | Age: 52
End: 2022-02-16

## 2022-02-16 RX ORDER — SCOLOPAMINE TRANSDERMAL SYSTEM 1 MG/1
1 PATCH, EXTENDED RELEASE TRANSDERMAL
Qty: 4 PATCH | Refills: 1 | Status: SHIPPED | OUTPATIENT
Start: 2022-02-16

## 2022-02-16 NOTE — TELEPHONE ENCOUNTER
Last Refill: 8-3-20  Last Visit: 2022   Next Visit: Visit date not found     Requested Prescriptions     Pending Prescriptions Disp Refills    scopolamine (TRANSDERM-SCOP) 1 mg over 3 days pt3d 4 Patch 1     Si Patch by TransDERmal route every seventy-two (72) hours.

## 2022-02-16 NOTE — TELEPHONE ENCOUNTER
From: Marianna Chin  To: Giovanni Soto MD  Sent: 2022 3:54 PM EST  Subject: Refill    May I please get a refill on my scopolamine patch with an additional refill for the future? I have 1 left but it is . Thank you! KALEB Olmos is perfect.    Cierra Pereira

## 2022-03-18 PROBLEM — M15.9 OSTEOARTHRITIS OF MULTIPLE JOINTS: Status: ACTIVE | Noted: 2018-03-19

## 2022-03-19 PROBLEM — L40.9 SCALP PSORIASIS: Status: ACTIVE | Noted: 2018-03-27

## 2022-03-19 PROBLEM — H83.2X9 VESTIBULAR DYSFUNCTION: Status: ACTIVE | Noted: 2018-03-19

## 2022-03-19 PROBLEM — L20.84 INTRINSIC ECZEMA: Status: ACTIVE | Noted: 2019-04-15

## 2022-03-19 PROBLEM — H81.90 VESTIBULAR DYSFUNCTION: Status: ACTIVE | Noted: 2018-03-19

## 2022-03-19 PROBLEM — G40.909 SEIZURE DISORDER (HCC): Status: ACTIVE | Noted: 2018-03-19

## 2022-03-20 PROBLEM — F41.9 ANXIETY: Status: ACTIVE | Noted: 2018-03-19

## 2022-03-20 PROBLEM — G43.909 MIGRAINE HEADACHE: Status: ACTIVE | Noted: 2018-03-19

## 2022-03-20 PROBLEM — E55.9 VITAMIN D DEFICIENCY: Status: ACTIVE | Noted: 2019-04-15

## 2022-06-03 DIAGNOSIS — G43.909 MIGRAINE WITHOUT STATUS MIGRAINOSUS, NOT INTRACTABLE, UNSPECIFIED MIGRAINE TYPE: ICD-10-CM

## 2022-06-03 RX ORDER — SUMATRIPTAN 100 MG/1
TABLET, FILM COATED ORAL
Qty: 9 TABLET | Refills: 1 | Status: SHIPPED | OUTPATIENT
Start: 2022-06-03 | End: 2022-08-18 | Stop reason: SDUPTHER

## 2022-06-03 NOTE — TELEPHONE ENCOUNTER
PCP: Connie Oakley MD    Last appt: 1/28/2022  Future Appointments   Date Time Provider Lynne Reich   8/18/2022  1:30 PM Jessie Hermosillo MD [facility]       Requested Prescriptions     Pending Prescriptions Disp Refills    SUMAtriptan (IMITREX) 100 mg tablet 9 Tablet 1     Sig: TAKE 1 TABLET ONCE AS NEEDED FOR MIGRAINE UP TO 1 DOSE         Other Comments:

## 2022-06-09 DIAGNOSIS — G40.909 SEIZURE DISORDER (HCC): ICD-10-CM

## 2022-06-09 RX ORDER — CARBAMAZEPINE 200 MG/1
TABLET ORAL
Qty: 225 TABLET | Refills: 0 | Status: SHIPPED | OUTPATIENT
Start: 2022-06-09 | End: 2022-08-18 | Stop reason: SDUPTHER

## 2022-06-09 NOTE — TELEPHONE ENCOUNTER
PCP: Michaela Meadows MD    Last appt: 1/28/2022  Future Appointments   Date Time Provider Lynne Reich   8/18/2022  1:30 PM Vignesh Gracia MD [facility]       Requested Prescriptions     Pending Prescriptions Disp Refills    carBAMazepine (TEGretol) 200 mg tablet 225 Tablet 0       Prior labs and Blood pressures:  BP Readings from Last 3 Encounters:   01/28/22 (!) 138/90   08/17/21 136/82   08/03/20 120/78     Lab Results   Component Value Date/Time    Sodium 136 08/17/2021 02:45 PM    Potassium 4.5 08/17/2021 02:45 PM    Chloride 106 08/17/2021 02:45 PM    CO2 26 08/17/2021 02:45 PM    Anion gap 4 (L) 08/17/2021 02:45 PM    Glucose 80 08/17/2021 02:45 PM    BUN 11 08/17/2021 02:45 PM    Creatinine 0.79 08/17/2021 02:45 PM    BUN/Creatinine ratio 14 08/17/2021 02:45 PM    GFR est AA >60 08/17/2021 02:45 PM    GFR est non-AA >60 08/17/2021 02:45 PM    Calcium 8.5 08/17/2021 02:45 PM

## 2022-08-14 NOTE — PROGRESS NOTES
Subjective:     Chief Complaint   Patient presents with    Myke Alexander is a 47 y.o. F.  She has a past medical history of anxiety and migraine headaches. I reviewed and updated the medical record. Patient was seen most recently in late January by her previous primary care provider. She was noted to be using chronic alprazolam for her panic attacks. She was noted at the time to not be using any medications for control of this issue other than alprazolam.  Physical examination was notable for hypertension with a blood pressure of 138/90 mmHg. As she was noted to be retiring soon, no changes were made to her anxiolytic medications. Today, the patient comes in for routine preventive care as well as follow-up on her chronic medical concerns. She reports feeling fine overall today and has no significant acute somatic complaints. Since retiring as a teacher, she has had significantly reduced anxiety overall. She says that overall she has far fewer days of being anxious, and she also denies having had any depression or suicidal ideation. She uses Xanax on a very rare basis, usually less than once per week, and finds this to be generally effective. She has not been on other medications for control of her anxiety in the past including any SSRI agents. She does use the Xanax on occasion for vertigo, but says that overall this has been well controlled of late as well. She has a history of migraine headaches and requests a refill of Imitrex. She says that she typically has fewer than 2-3 headache days per month, although last month was anomalous in so far as she had to use the Imitrex 3-4 times. She denies having had any change in her headache pattern or severity or any morning headaches, positional headaches, or other similar problems. She continues on carbamazepine for a history of seizure disorder.   She notes that her neurologist had told her some years ago that she would probably be able to discontinue this medication entirely, but out of fear that she would precipitate seizure while driving, she has remained on a low-dose of this medication ever since. She denies having had any skin problems, GI symptoms, rashes, or other problems with the use of this medication. Her last carbamazepine level last year was within normal limits. She has a history of diet-controlled hypercholesterolemia, with her most recent LDL being relatively low but still above 100 mg/dL. She denies having had any history of heart disease or peripheral vascular disease or other cardiovascular comorbidities. Routine Healthcare Maintenance issues are reviewed and discussed with the patient as noted below. Orders to update gaps in healthcare maintenance were placed as noted below in the Assessment and Plan, where applicable. She denies any family history of breast cancer or colon cancer or ovarian cancer. She has her mammogram scheduled for next month. She is overdue for screening colonoscopy and is willing to get this. 10-year Cardiovascular Risk [place], 2013):   The 10-year ASCVD risk score (Linda Sheridan et al., 2013) is: 1.3%    Values used to calculate the score:      Age: 47 years      Sex: Female      Is Non- : No      Diabetic: No      Tobacco smoker: No      Systolic Blood Pressure: 095 mmHg      Is BP treated: No      HDL Cholesterol: 79 MG/DL      Total Cholesterol: 211 MG/DL       Past Medical History:  Past Medical History:   Diagnosis Date    Anxiety 03/19/2018    Hypercholesterolemia     ASCVD risk 8/22 = 1.3%    Long-term current use of benzodiazepine     Migraine headache 03/19/2018    Osteoarthritis of multiple joints 03/19/2018    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 [address] 03/19/2018    Vestibular dysfunction 03/19/2018       Past Surgical Histor:  Past Surgical History:   Procedure Laterality Date    HX TONSILLECTOMY         Allergies:  No Known Allergies    Medications:  Current Outpatient Medications   Medication Sig Dispense Refill    carBAMazepine (TEGretol) 200 mg tablet TAKE 1 TABLET DAILY AND THEN ONE AND ONE-HALF TABLETS AT NIGHT 225 Tablet 0    SUMAtriptan (IMITREX) 100 mg tablet TAKE 1 TABLET ONCE AS NEEDED FOR MIGRAINE UP TO 1 DOSE 9 Tablet 1    scopolamine (TRANSDERM-SCOP) 1 mg over 3 days pt3d 1 Patch by TransDERmal route every seventy-two (72) hours. 4 Patch 1    ALPRAZolam (Xanax) 0.5 mg tablet Take 1 Tablet by mouth as needed for Anxiety (for dizziness). Max Daily Amount: 48 mg. 30 Tablet 1       Social History:  Social History     Socioeconomic History    Marital status:     Number of children: 2   Occupational History    Occupation:    Tobacco Use    Smoking status: Never    Smokeless tobacco: Never   Vaping Use    Vaping Use: Never used   Substance and Sexual Activity    Alcohol use:  Yes     Alcohol/week: 3.0 standard drinks     Types: 3 Glasses of wine per week    Drug use: No       Family History:  Family History   Problem Relation Age of Onset    OSTEOARTHRITIS Mother     Heart Attack Father     Hypertension Father     Breast Cancer Maternal Aunt        Immunizations:  Immunization History   Administered Date(s) Administered    Influenza Vaccine 12/01/2017, 09/24/2020, 10/01/2021    Influenza, FLUARIX, FLULAVAL, (age 10 mo+) AND AFLURIA, FLUZONE (age 1 y+), PF 11/13/2018    Influenza, FLUCELVAX, (age 10 mo+), MDCK, PF 10/20/2019    Zoster Recombinant 02/05/2020, 05/31/2020        Healthcare Maintenance:  Health Maintenance   Topic Date Due    COVID-19 Vaccine (1) Never done    DTaP/Tdap/Td series (1 - Tdap) Never done    Cervical cancer screen  Never done    Colorectal Cancer Screening Combo  07/09/2022    Breast Cancer Screen Mammogram  07/21/2022    Flu Vaccine (1) 09/01/2022    Depression Screen  01/28/2023    Lipid Screen  11/18/2026    Hepatitis C Screening  Completed    Shingrix Vaccine Age 50>  Completed Pneumococcal 0-64 years  Aged Out        Review of Systems:  ROS:  Review of Systems   Constitutional: Negative. HENT: Negative. Eyes: Negative. Respiratory: Negative. Cardiovascular: Negative. Gastrointestinal: Negative. Genitourinary: Negative. Musculoskeletal: Negative. Skin: Negative. Neurological: Negative. Endo/Heme/Allergies: Negative. Psychiatric/Behavioral: Negative. ROS otherwise negative      Objective:     Vital Signs:  Visit Vitals  /86 (BP 1 Location: Right arm, BP Patient Position: Sitting, BP Cuff Size: Small adult)   Pulse 84   Temp 98.1 °F (36.7 °C) (Oral)   Resp 20   Ht 5' 6\" (1.676 m)   Wt 162 lb (73.5 kg)   SpO2 98%   BMI 26.15 kg/m²       BMI:  Body mass index is 26.15 kg/m². Physical Examination:  Physical Exam  Constitutional:       Appearance: Normal appearance. She is normal weight. HENT:      Head: Normocephalic and atraumatic. Right Ear: External ear normal.      Left Ear: External ear normal.      Nose: Nose normal.      Mouth/Throat:      Mouth: Mucous membranes are moist.      Pharynx: Oropharynx is clear. No oropharyngeal exudate or posterior oropharyngeal erythema. Cardiovascular:      Rate and Rhythm: Normal rate and regular rhythm. Pulses: Normal pulses. Heart sounds: Normal heart sounds. No murmur heard. No friction rub. No gallop. Pulmonary:      Effort: Pulmonary effort is normal. No respiratory distress. Breath sounds: Normal breath sounds. No wheezing, rhonchi or rales. Abdominal:      General: Abdomen is flat. Bowel sounds are normal. There is no distension. Palpations: Abdomen is soft. Tenderness: no abdominal tenderness There is no guarding. Musculoskeletal:         General: No swelling, tenderness or deformity. Normal range of motion. Cervical back: Normal range of motion and neck supple. No rigidity or tenderness. Skin:     General: Skin is warm and dry.       Findings: No erythema, lesion or rash. Neurological:      General: No focal deficit present. Mental Status: She is alert and oriented to person, place, and time. Mental status is at baseline. Sensory: No sensory deficit. Motor: No weakness. Gait: Gait normal.      Deep Tendon Reflexes: Reflexes normal.   Psychiatric:         Mood and Affect: Mood normal.         Behavior: Behavior normal.         Judgment: Judgment normal.        Physical exam otherwise negative    Diagnostic Testing:    Laboratory Studies:  Lab Only on 11/18/2021   Component Date Value Ref Range Status    Cholesterol, total 11/18/2021 211 (A) <200 MG/DL Final    Triglyceride 11/18/2021 114  <150 MG/DL Final    Comment: Based on NCEP-ATP III:  Triglycerides <150 mg/dL  is considered normal, 150-199  mg/dL  borderline high,  200-499 mg/dL high and  greater than or equal to 500  mg/dL very high. HDL Cholesterol 11/18/2021 79  MG/DL Final    Comment: Based on NCEP ATP III, HDL Cholesterol <40 mg/dL is considered low and >60  mg/dL is elevated. LDL, calculated 11/18/2021 109.2 (A) 0 - 100 MG/DL Final    Comment: Based on the NCEP-ATP: LDL-C concentrations are considered  optimal <100 mg/dL,  near optimal/above Normal 100-129 mg/dL Borderline High: 130-159, High: 160-189  mg/dL Very High: Greater than or equal to 190 mg/dL      VLDL, calculated 11/18/2021 22.8  MG/DL Final    CHOL/HDL Ratio 11/18/2021 2.7  0.0 - 5.0   Final         Radiographic Studies:  XR Results (most recent):  Results from Appointment encounter on 08/01/19    XR HAND RT MIN 3 V    Narrative  Right hand 3 views dated 8/1/2019    History is injury    AP, lateral and oblique views of the right hand were obtained. A metallic ring  is situated about the mid/distal portion of the proximal phalanx of the fourth  digit. Subtle juxta-articular osteopenic change is present.  There is no evidence  of acute, displaced fracture as seen on this examination. Impression  IMPRESSION: No definite evidence of acute, displaced fracture involving the  bones of the right hand. [facility] Results (most recent):  Results from Abstract encounter on 12/30/20    [facility] 3D SOHAM W MAMMO BI SCREENING INCL CAD     CT Results (most recent):  No results found for this or any previous visit. DEXA Results (most recent):  No results found for this or any previous visit. MRI Results (most recent):  No results found for this or any previous visit. Assessment/Plan:       ICD-10-CM ICD-9-CM    1. Routine adult health maintenance  Z00.00 V70.0       2. Seizure disorder (HCC)  G40.909 345.90 carBAMazepine (TEGretol) 200 mg tablet      CBC WITH AUTOMATED DIFF      METABOLIC PANEL, COMPREHENSIVE      CARBAMAZEPINE      3. Migraine without status migrainosus, not intractable, unspecified migraine type  G43.909 346.90 SUMAtriptan (IMITREX) 100 mg tablet      4. Screen for colon cancer  Z12.11 V76.51 REFERRAL FOR COLONOSCOPY      5. Hypercholesterolemia  E78.00 272.0 LIPID PANEL      6. On carbamazepine therapy  Z79.899 V58.69 IRON             Healthcare Maintenance:  - Preventive measures are reviewed as per above  - Up to date on routine interventions except as noted above  - Orders placed to update gaps as noted  - Notes: Patient is up-to-date with regard to most routine screening measures. She will be getting her mammogram next month. Referral for colonoscopy is placed as noted above. Discussed vaccines to be done at outside pharmacy. Seizure Disorder:   - She has longstanding history of prior seizures but has not had one in several decades and continues on carbamazepine. She is on a low-dose of this. She already gets her eyes examined, with monitoring of her intraocular pressures; she does have a history of macular degeneration. Additional laboratory studies ordered as noted above to include carbamazepine level, iron level for monitoring.   She has not had any seizures recently. Continuing with current care for now. Migraine Headaches:   - Well-controlled currently on minimal medications, with Imitrex only on an as needed basis. No indication at this point to modify therapy although I would consider changing her to amitriptyline in the future if necessary, with consideration for other medications for control for migraines if warranted. Anxiety/Depression:   - Current PHQ: No data recorded    - Current RX:   Key Psychotherapeutic Meds       Patient is on no psychotherapeutic meds. - Psychology/Psychiatry Co-managing? No   - The patient is using Xanax but on a very sparing basis usually less than once per week. Given this, deferring additional controller medication such as an SSRI for the time being; we discussed the addictive potential associated with benzodiazepines, and also noted the other risks associated with these medications and I advised the patient to minimize her use of this medication if possible; she endorses agreement and understanding. Hyperlipidemia/Dyslipidemia:   - Summary of Cardiovascular Risks and Goals:     LDL goal is under 100  hyperlipidemia  [place] 10-year risk <10%   -   Lab Results   Component Value Date/Time    LDL, calculated 109.2 (H) 11/18/2021 09:45 AM    HDL Cholesterol 79 11/18/2021 09:45 AM       - Relevant Cholesterol Meds:  Key Antihyperlipidemia Meds       The patient is on no antihyperlipidemia meds. - Cholesterol at target: yes   - Does patient meet USPSTF and ACC/AHA indications for pharmacotherapy (e.g., statin): no   - GI symptoms with meds: N/A   - Muscle aches with meds: N/A   - Other Adverse effects with meds: N/A   - Medication Plan:  none   - Notes: ASCVD risk is quite low; deferring additional pharmacotherapy at this point; recent LDL very reasonably close to target less than 100 mg/dL; continuing with current care with dietary modifications.       Follow-up and Dispositions    Return in about 1 year (around 8/18/2023). I have reviewed the patient's medical history in detail and updated the computerized patient record. We had a prolonged discussion about these complex clinical issues and went over the various important aspects to consider. All questions were answered. Advised the patient to call back or return to office if symptoms do not improve, change in nature, or persist.     The patient was given an after visit summary or informed of [employer] [facility] which includes patient instructions, diagnoses, current medications, & vitals. she expressed understanding with the diagnosis and plan. Susan Norwood MD    Please note that this dictation was completed with Fund[employer], the computer voice recognition software. Quite often unanticipated grammatical, syntax, homophones, and other interpretive errors are inadvertently transcribed by the computer software. Please disregard these errors. Please excuse any errors that have escaped final proofreading.

## 2022-08-18 ENCOUNTER — OFFICE VISIT (OUTPATIENT)
Dept: INTERNAL MEDICINE CLINIC | Age: 52
End: 2022-08-18
Payer: COMMERCIAL

## 2022-08-18 VITALS
DIASTOLIC BLOOD PRESSURE: 86 MMHG | TEMPERATURE: 98.1 F | HEIGHT: 66 IN | WEIGHT: 162 LBS | BODY MASS INDEX: 26.03 KG/M2 | SYSTOLIC BLOOD PRESSURE: 132 MMHG | HEART RATE: 84 BPM | RESPIRATION RATE: 20 BRPM | OXYGEN SATURATION: 98 %

## 2022-08-18 DIAGNOSIS — Z00.00 ROUTINE ADULT HEALTH MAINTENANCE: Primary | ICD-10-CM

## 2022-08-18 DIAGNOSIS — E78.00 HYPERCHOLESTEROLEMIA: ICD-10-CM

## 2022-08-18 DIAGNOSIS — G40.909 SEIZURE DISORDER (HCC): ICD-10-CM

## 2022-08-18 DIAGNOSIS — Z12.11 SCREEN FOR COLON CANCER: ICD-10-CM

## 2022-08-18 DIAGNOSIS — Z79.899 ON CARBAMAZEPINE THERAPY: ICD-10-CM

## 2022-08-18 DIAGNOSIS — G43.909 MIGRAINE WITHOUT STATUS MIGRAINOSUS, NOT INTRACTABLE, UNSPECIFIED MIGRAINE TYPE: ICD-10-CM

## 2022-08-18 PROCEDURE — 99396 PREV VISIT EST AGE 40-64: CPT | Performed by: INTERNAL MEDICINE

## 2022-08-18 PROCEDURE — 99214 OFFICE O/P EST MOD 30 MIN: CPT | Performed by: INTERNAL MEDICINE

## 2022-08-18 RX ORDER — CARBAMAZEPINE 200 MG/1
TABLET ORAL
Qty: 225 TABLET | Refills: 0 | Status: SHIPPED | OUTPATIENT
Start: 2022-08-18

## 2022-08-18 RX ORDER — SUMATRIPTAN 100 MG/1
TABLET, FILM COATED ORAL
Qty: 9 TABLET | Refills: 1 | Status: SHIPPED | OUTPATIENT
Start: 2022-08-18

## 2022-08-18 NOTE — PROGRESS NOTES
Chief Complaint   Patient presents with    Establish Care   Visit Vitals  /86 (BP 1 Location: Right arm, BP Patient Position: Sitting, BP Cuff Size: Small adult)   Pulse 84   Temp 98.1 °F (36.7 °C) (Oral)   Resp 20   Ht 5' 6\" (1.676 m)   Wt 162 lb (73.5 kg)   SpO2 98%   BMI 26.15 kg/m²         1. \"Have you been to the ER, urgent care clinic since your last visit? Hospitalized since your last visit? \" No    2. \"Have you seen or consulted any other health care providers outside of the [address] [place] [address] since your last visit? \" No     3. For patients aged 39-70: Has the patient had a colonoscopy / FIT/ Cologuard? No      If the patient is female:    4. For patients aged 41-77: Has the patient had a mammogram within the past 2 years? Patient's mammogram is in August      5. For patients aged 21-65: Has the patient had a pap smear?  No

## 2022-08-18 NOTE — PATIENT INSTRUCTIONS
Learning About the [facility] Diet  What is the Mediterranean diet? The Mediterranean diet is a style of eating rather than a diet plan. It features foods eaten in [place], [place], [place] and [place], and other countries along the [facility]. It emphasizes eating foods like fish, fruits, vegetables, beans, high-fiber breads and whole grains, nuts, and olive oil. This style of eating includes limited red meat, cheese, and sweets. Why choose the Mediterranean diet? A Mediterranean-style diet may improve heart health. It contains more fat than other heart-healthy diets. But the fats are mainly from nuts, unsaturated oils (such as fish oils and olive oil), and certain nut or seed oils (such as canola, soybean, or flaxseed oil). These fats may help protect the heart and blood vessels. How can you get started on the Mediterranean diet? Here are some things you can do to switch to a more Mediterranean way of eating. What to eat  Eat a variety of fruits and vegetables each day, such as grapes, blueberries, tomatoes, broccoli, peppers, figs, olives, spinach, eggplant, beans, lentils, and chickpeas. Eat a variety of whole-grain foods each day, such as oats, brown rice, and whole wheat bread, pasta, and couscous. Eat fish at least 2 times a week. Try tuna, salmon, mackerel, lake trout, herring, or sardines. Eat moderate amounts of low-fat dairy products, such as milk, cheese, or yogurt. Eat moderate amounts of poultry and eggs. Choose healthy (unsaturated) fats, such as nuts, olive oil, and certain nut or seed oils like canola, soybean, and flaxseed. Limit unhealthy (saturated) fats, such as butter, palm oil, and coconut oil. And limit fats found in animal products, such as meat and dairy products made with whole milk. Try to eat red meat only a few times a month in very small amounts. Limit sweets and desserts to only a few times a week. This includes sugar-sweetened drinks like soda.   The Mediterranean diet may also include red wine with your meal--1 glass each day for women and up to 2 glasses a day for men. Tips for eating at home  Use herbs, spices, garlic, lemon zest, and citrus juice instead of salt to add flavor to foods. Add avocado slices to your sandwich instead of leon. Have fish for lunch or dinner instead of red meat. Brush the fish with olive oil, and broil or grill it. Sprinkle your salad with seeds or nuts instead of cheese. Cook with olive or canola oil instead of butter or oils that are high in saturated fat. Switch from 2% milk or whole milk to 1% or fat-free milk. Dip raw vegetables in a vinaigrette dressing or hummus instead of dips made from mayonnaise or sour cream.  Have a piece of fruit for dessert instead of a piece of cake. Try baked apples, or have some dried fruit. Tips for eating out  Try broiled, grilled, baked, or poached fish instead of having it fried or breaded. Ask your  to have your meals prepared with olive oil instead of butter. Order dishes made with marinara sauce or sauces made from olive oil. Avoid sauces made from cream or mayonnaise. Choose whole-grain breads, whole wheat pasta and pizza crust, brown rice, beans, and lentils. Cut back on butter or margarine on bread. Instead, you can dip your bread in a small amount of olive oil. Ask for a side salad or grilled vegetables instead of french fries or chips. Where can you learn more? Go to http://www.faye.com/  Enter O407 in the search box to learn more about \"Learning About the Mediterranean Diet. \"  Current as of: September 8, 2021               Content Version: 13.2  © 3205-0824 Healthwise, Incorporated. Care instructions adapted under license by [employer] (which disclaims liability or warranty for this information).  If you have questions about a medical condition or this instruction, always ask your healthcare professional. Janine Brandt, [facility] disclaims any warranty or liability for your use of this information. Learning About Colonoscopy  What is a colonoscopy? A colonoscopy is a test (also called a procedure) that lets a doctor look inside your large intestine. The doctor uses a thin, lighted tube called a colonoscope. The doctor uses it to look for small growths called polyps, colon or rectal cancer (colorectal cancer), or other problems like bleeding. During the procedure, the doctor can take samples of tissue. The samples can then be checked for cancer or other conditions. The doctor can also take out polyps. How is a colonoscopy done? This procedure is done in a doctor's office or a clinic or hospital. You will get medicine to help you relax and not feel pain. Some people find that they don't remember having the test because of the medicine. The doctor gently moves the colonoscope, or scope, through the colon. The scope is also a small video camera. It lets the doctor see the colon and take pictures. How do you prepare for the procedure? You need to clean out your colon before the procedure so the doctor can see your colon. This depends on which \"colon prep\" your doctor recommends. To clean out your colon, you'll do a \"colon prep\" before the test. This means you stop eating solid foods and drink only clear liquids. You can have water, tea, coffee, clear juices, clear broths, flavored ice pops, and gelatin (such as Jell-O). Do not drink anything red or purple. The day or night before the procedure, you drink a large amount of a special liquid. This causes loose, frequent stools. You will go to the bathroom a lot. Your doctor may have you drink part of the liquid the evening before and the rest on the day of the test. It's very important to drink all of the liquid. If you have problems drinking it, call your doctor. Arrange to have someone take you home after the test.  What can you expect after a colonoscopy?   Your doctor will tell you when you can eat and do your usual activities. Drink a lot of fluid after the test to replace the fluids you may have lost during the colon prep. But don't drink alcohol. Your doctor will talk to you about when you'll need your next colonoscopy. The results of your test and your risk for colorectal cancer will help your doctor decide how often you need to be checked. After the test, you may be bloated or have gas pains. You may need to pass gas. If a biopsy was done or a polyp was removed, you may have streaks of blood in your stool (feces) for a few days. Check with your doctor to see when it is safe to take aspirin and nonsteroidal anti-inflammatory drugs (NSAIDs) again. Problems such as heavy rectal bleeding may not occur until several weeks after the test. This isn't common. But it can happen after polyps are removed. Follow-up care is a key part of your treatment and safety. Be sure to make and go to all appointments, and call your doctor if you are having problems. It's also a good idea to know your test results and keep a list of the medicines you take. Where can you learn more? Go to http://www.gray.com/  Enter Z368 in the search box to learn more about \"Learning About Colonoscopy. \"  Current as of: September 8, 2021               Content Version: 13.2  © 7693-9285 Healthwise, Incorporated. Care instructions adapted under license by [employer] (which disclaims liability or warranty for this information). If you have questions about a medical condition or this instruction, always ask your healthcare professional. [place] [address] any warranty or liability for your use of this information.

## 2022-09-09 LAB
ALBUMIN SERPL-MCNC: 4.3 G/DL (ref 3.8–4.9)
ALBUMIN/GLOB SERPL: 1.7 {RATIO} (ref 1.2–2.2)
ALP SERPL-CCNC: 81 IU/L (ref 44–121)
ALT SERPL-CCNC: 6 IU/L (ref 0–32)
AST SERPL-CCNC: 15 IU/L (ref 0–40)
BASOPHILS # BLD AUTO: 0.1 X10E3/UL (ref 0–0.2)
BASOPHILS NFR BLD AUTO: 1 %
BILIRUB SERPL-MCNC: 0.4 MG/DL (ref 0–1.2)
BUN SERPL-MCNC: 8 MG/DL (ref 6–24)
BUN/CREAT SERPL: 14 (ref 9–23)
CALCIUM SERPL-MCNC: 8.4 MG/DL (ref 8.7–10.2)
CARBAMAZEPINE SERPL-MCNC: 7 UG/ML (ref 4–12)
CHLORIDE SERPL-SCNC: 102 MMOL/L (ref 96–106)
CHOLEST SERPL-MCNC: 196 MG/DL (ref 100–199)
CO2 SERPL-SCNC: 24 MMOL/L (ref 20–29)
CREAT SERPL-MCNC: 0.56 MG/DL (ref 0.57–1)
EGFR: 109 ML/MIN/1.73
EOSINOPHIL # BLD AUTO: 0.1 X10E3/UL (ref 0–0.4)
EOSINOPHIL NFR BLD AUTO: 1 %
ERYTHROCYTE [DISTWIDTH] IN BLOOD BY AUTOMATED COUNT: 12 % (ref 11.7–15.4)
GLOBULIN SER CALC-MCNC: 2.5 G/DL (ref 1.5–4.5)
GLUCOSE SERPL-MCNC: 85 MG/DL (ref 65–99)
HCT VFR BLD AUTO: 42.5 % (ref 34–46.6)
HDLC SERPL-MCNC: 73 MG/DL
HGB BLD-MCNC: 14 G/DL (ref 11.1–15.9)
IMM GRANULOCYTES # BLD AUTO: 0 X10E3/UL (ref 0–0.1)
IMM GRANULOCYTES NFR BLD AUTO: 0 %
LDLC SERPL CALC-MCNC: 106 MG/DL (ref 0–99)
LYMPHOCYTES # BLD AUTO: 1.5 X10E3/UL (ref 0.7–3.1)
LYMPHOCYTES NFR BLD AUTO: 28 %
MCH RBC QN AUTO: 34.1 PG (ref 26.6–33)
MCHC RBC AUTO-ENTMCNC: 32.9 G/DL (ref 31.5–35.7)
MCV RBC AUTO: 103 FL (ref 79–97)
MONOCYTES # BLD AUTO: 0.4 X10E3/UL (ref 0.1–0.9)
MONOCYTES NFR BLD AUTO: 8 %
NEUTROPHILS # BLD AUTO: 3.2 X10E3/UL (ref 1.4–7)
NEUTROPHILS NFR BLD AUTO: 62 %
PLATELET # BLD AUTO: 281 X10E3/UL (ref 150–450)
POTASSIUM SERPL-SCNC: 4.6 MMOL/L (ref 3.5–5.2)
PROT SERPL-MCNC: 6.8 G/DL (ref 6–8.5)
RBC # BLD AUTO: 4.11 X10E6/UL (ref 3.77–5.28)
SODIUM SERPL-SCNC: 140 MMOL/L (ref 134–144)
TRIGL SERPL-MCNC: 97 MG/DL (ref 0–149)
VLDLC SERPL CALC-MCNC: 17 MG/DL (ref 5–40)
WBC # BLD AUTO: 5.2 X10E3/UL (ref 3.4–10.8)

## 2022-11-16 DIAGNOSIS — G40.909 SEIZURE DISORDER (HCC): ICD-10-CM

## 2022-11-16 RX ORDER — CARBAMAZEPINE 200 MG/1
TABLET ORAL
Qty: 225 TABLET | Refills: 3 | Status: SHIPPED | OUTPATIENT
Start: 2022-11-16

## 2023-01-03 ENCOUNTER — PATIENT MESSAGE (OUTPATIENT)
Dept: INTERNAL MEDICINE CLINIC | Age: 53
End: 2023-01-03

## 2023-01-03 DIAGNOSIS — G43.909 MIGRAINE WITHOUT STATUS MIGRAINOSUS, NOT INTRACTABLE, UNSPECIFIED MIGRAINE TYPE: ICD-10-CM

## 2023-01-03 DIAGNOSIS — G40.909 SEIZURE DISORDER (HCC): ICD-10-CM

## 2023-01-03 DIAGNOSIS — F41.9 ANXIETY: ICD-10-CM

## 2023-01-03 RX ORDER — SUMATRIPTAN 100 MG/1
TABLET, FILM COATED ORAL
Qty: 9 TABLET | Refills: 1 | Status: SHIPPED | OUTPATIENT
Start: 2023-01-03

## 2023-01-03 RX ORDER — ALPRAZOLAM 0.5 MG/1
0.5 TABLET ORAL AS NEEDED
Qty: 30 TABLET | Refills: 1 | Status: SHIPPED | OUTPATIENT
Start: 2023-01-03

## 2023-01-03 RX ORDER — CARBAMAZEPINE 200 MG/1
TABLET ORAL
Qty: 225 TABLET | Refills: 3 | Status: CANCELLED | OUTPATIENT
Start: 2023-01-03

## 2023-01-03 RX ORDER — CARBAMAZEPINE 200 MG/1
TABLET ORAL
Qty: 225 TABLET | Refills: 3 | Status: SHIPPED | OUTPATIENT
Start: 2023-01-03

## 2023-01-03 NOTE — TELEPHONE ENCOUNTER
Requested Prescriptions     Pending Prescriptions Disp Refills    carBAMazepine (TEGretol) 200 mg tablet 225 Tablet 3     Sig: TAKE 1 TABLET DAILY AND ONE AND ONE-HALF TABLETS AT NIGHT    SUMAtriptan (IMITREX) 100 mg tablet 9 Tablet 1     Sig: TAKE 1 TABLET ONCE AS NEEDED FOR MIGRAINE UP TO 1 DOSE       RX refill request from the patient/pharmacy. Patient last seen 8/18/22 with labs, and next appt. scheduled for 8/21/23.

## 2023-03-10 DIAGNOSIS — G40.909 SEIZURE DISORDER (HCC): ICD-10-CM

## 2023-03-10 RX ORDER — CARBAMAZEPINE 200 MG/1
TABLET ORAL
Qty: 225 TABLET | Refills: 3 | Status: SHIPPED | OUTPATIENT
Start: 2023-03-10

## 2023-03-10 NOTE — TELEPHONE ENCOUNTER
PCP: Rosa Negro MD    Last appt: 8/18/2022  Future Appointments   Date Time Provider Lynne Reich   8/21/2023 10:00 AM Rosa Negro MD [facility] AM[facility]       Requested Prescriptions     Pending Prescriptions Disp Refills    carBAMazepine (TEGretol) 200 mg tablet 225 Tablet 3     Sig: TAKE 1 TABLET DAILY AND ONE AND ONE-HALF TABLETS AT NIGHT       Prior labs and Blood pressures:  BP Readings from Last 3 Encounters:   08/18/22 132/86   01/28/22 (!) 138/90   08/17/21 136/82     Lab Results   Component Value Date/Time    Sodium 140 09/08/2022 10:32 AM    Potassium 4.6 09/08/2022 10:32 AM    Chloride 102 09/08/2022 10:32 AM    CO2 24 09/08/2022 10:32 AM    Anion gap 4 (L) 08/17/2021 02:45 PM    Glucose 85 09/08/2022 10:32 AM    BUN 8 09/08/2022 10:32 AM    Creatinine 0.56 (L) 09/08/2022 10:32 AM    BUN/Creatinine ratio 14 09/08/2022 10:32 AM    GFR est AA >60 08/17/2021 02:45 PM    GFR est non-AA >60 08/17/2021 02:45 PM    Calcium 8.4 (L) 09/08/2022 10:32 AM     Lab Results   Component Value Date/Time    Hemoglobin A1c (POC) 4.6 03/27/2018 03:49 PM     Lab Results   Component Value Date/Time    Cholesterol, total 196 09/08/2022 10:32 AM    Cholesterol (POC) 220.0 (A) 03/27/2018 03:49 PM    HDL Cholesterol 73 09/08/2022 10:32 AM    HDL Cholesterol (POC) 122.0 03/27/2018 03:49 PM    LDL Cholesterol (POC) 79.4 03/27/2018 03:49 PM    LDL, calculated 106 (H) 09/08/2022 10:32 AM    LDL, calculated 109.2 (H) 11/18/2021 09:45 AM    VLDL, calculated 17 09/08/2022 10:32 AM    VLDL, calculated 22.8 11/18/2021 09:45 AM    Triglyceride 97 09/08/2022 10:32 AM    Triglycerides (POC) 93.0 03/27/2018 03:49 PM    CHOL/HDL Ratio 2.7 11/18/2021 09:45 AM     Lab Results   Component Value Date/Time    Vitamin D 25-Hydroxy 39.8 08/17/2021 02:45 PM       Lab Results   Component Value Date/Time    TSH 1.10 08/17/2021 02:45 PM    TSH, 3rd generation 2.24 08/03/2020 02:15 PM

## 2023-05-21 RX ORDER — SCOLOPAMINE TRANSDERMAL SYSTEM 1 MG/1
1 PATCH, EXTENDED RELEASE TRANSDERMAL
COMMUNITY
Start: 2022-02-16

## 2023-05-21 RX ORDER — ALPRAZOLAM 0.5 MG/1
0.5 TABLET ORAL PRN
COMMUNITY
Start: 2023-01-03

## 2023-05-21 RX ORDER — CARBAMAZEPINE 200 MG/1
TABLET ORAL
COMMUNITY
Start: 2023-03-10

## 2023-05-21 RX ORDER — SUMATRIPTAN 100 MG/1
TABLET, FILM COATED ORAL
COMMUNITY
Start: 2023-01-03 | End: 2023-06-08

## 2023-06-07 DIAGNOSIS — G43.909 MIGRAINE WITHOUT STATUS MIGRAINOSUS, NOT INTRACTABLE, UNSPECIFIED MIGRAINE TYPE: Primary | ICD-10-CM

## 2023-06-08 RX ORDER — SUMATRIPTAN 100 MG/1
100 TABLET, FILM COATED ORAL
Qty: 9 TABLET | Refills: 1 | Status: SHIPPED | OUTPATIENT
Start: 2023-06-08 | End: 2023-06-08

## 2023-06-08 NOTE — TELEPHONE ENCOUNTER
Requested Prescriptions     Pending Prescriptions Disp Refills    SUMAtriptan (IMITREX) 100 MG tablet [Pharmacy Med Name: SUMATRIPTAN TABS 9'S 100MG] 9 tablet 27     Sig: TAKE 1 TABLET ONCE AS NEEDED FOR MIGRAINE FOR UP TO 1 DOSE         RX refill request from the patient/pharmacy. Patient last seen 8/18/22 with labs, and next appt. scheduled for 8/21/23.